# Patient Record
Sex: MALE | Race: WHITE | Employment: FULL TIME | URBAN - METROPOLITAN AREA
[De-identification: names, ages, dates, MRNs, and addresses within clinical notes are randomized per-mention and may not be internally consistent; named-entity substitution may affect disease eponyms.]

---

## 2019-08-16 ENCOUNTER — HOSPITAL ENCOUNTER (INPATIENT)
Age: 59
LOS: 3 days | Discharge: HOME | DRG: 247 | End: 2019-08-19
Attending: EMERGENCY MEDICINE | Admitting: INTERNAL MEDICINE
Payer: OTHER GOVERNMENT

## 2019-08-16 ENCOUNTER — APPOINTMENT (OUTPATIENT)
Dept: GENERAL RADIOLOGY | Age: 59
DRG: 247 | End: 2019-08-16
Attending: EMERGENCY MEDICINE
Payer: OTHER GOVERNMENT

## 2019-08-16 DIAGNOSIS — I49.3 PVC'S (PREMATURE VENTRICULAR CONTRACTIONS): ICD-10-CM

## 2019-08-16 DIAGNOSIS — R07.9 CHEST PAIN: ICD-10-CM

## 2019-08-16 DIAGNOSIS — R07.9 EXERTIONAL CHEST PAIN: Primary | ICD-10-CM

## 2019-08-16 PROBLEM — R79.89 ELEVATED SERUM CREATININE: Status: ACTIVE | Noted: 2019-08-16

## 2019-08-16 PROBLEM — Z99.89 OSA ON CPAP: Status: ACTIVE | Noted: 2019-08-16

## 2019-08-16 PROBLEM — G47.33 OSA ON CPAP: Status: ACTIVE | Noted: 2019-08-16

## 2019-08-16 LAB
ALBUMIN SERPL-MCNC: 3.9 G/DL (ref 3.4–5)
ALBUMIN/GLOB SERPL: 1.3 {RATIO} (ref 0.8–1.7)
ALP SERPL-CCNC: 71 U/L (ref 45–117)
ALT SERPL-CCNC: 38 U/L (ref 16–61)
ANION GAP SERPL CALC-SCNC: 9 MMOL/L (ref 3–18)
APTT PPP: 26.5 SEC (ref 23–36.4)
AST SERPL-CCNC: 19 U/L (ref 10–38)
ATRIAL RATE: 57 BPM
ATRIAL RATE: 83 BPM
BASOPHILS # BLD: 0 K/UL (ref 0–0.1)
BASOPHILS NFR BLD: 0 % (ref 0–2)
BILIRUB SERPL-MCNC: 0.3 MG/DL (ref 0.2–1)
BUN SERPL-MCNC: 18 MG/DL (ref 7–18)
BUN/CREAT SERPL: 13 (ref 12–20)
CALCIUM SERPL-MCNC: 9 MG/DL (ref 8.5–10.1)
CALCULATED P AXIS, ECG09: 38 DEGREES
CALCULATED P AXIS, ECG09: 44 DEGREES
CALCULATED R AXIS, ECG10: -20 DEGREES
CALCULATED R AXIS, ECG10: -26 DEGREES
CALCULATED T AXIS, ECG11: 20 DEGREES
CALCULATED T AXIS, ECG11: 20 DEGREES
CHLORIDE SERPL-SCNC: 103 MMOL/L (ref 100–111)
CK MB CFR SERPL CALC: 1.2 % (ref 0–4)
CK MB SERPL-MCNC: 1.1 NG/ML (ref 5–25)
CK SERPL-CCNC: 89 U/L (ref 39–308)
CO2 SERPL-SCNC: 28 MMOL/L (ref 21–32)
CREAT SERPL-MCNC: 1.41 MG/DL (ref 0.6–1.3)
D DIMER PPP FEU-MCNC: 0.36 UG/ML(FEU)
DIAGNOSIS, 93000: NORMAL
DIAGNOSIS, 93000: NORMAL
DIFFERENTIAL METHOD BLD: ABNORMAL
EOSINOPHIL # BLD: 0.1 K/UL (ref 0–0.4)
EOSINOPHIL NFR BLD: 2 % (ref 0–5)
ERYTHROCYTE [DISTWIDTH] IN BLOOD BY AUTOMATED COUNT: 13.2 % (ref 11.6–14.5)
GLOBULIN SER CALC-MCNC: 2.9 G/DL (ref 2–4)
GLUCOSE SERPL-MCNC: 112 MG/DL (ref 74–99)
HCT VFR BLD AUTO: 42 % (ref 36–48)
HGB BLD-MCNC: 15 G/DL (ref 13–16)
LYMPHOCYTES # BLD: 2 K/UL (ref 0.9–3.6)
LYMPHOCYTES NFR BLD: 24 % (ref 21–52)
MAGNESIUM SERPL-MCNC: 2 MG/DL (ref 1.6–2.6)
MCH RBC QN AUTO: 33.1 PG (ref 24–34)
MCHC RBC AUTO-ENTMCNC: 35.7 G/DL (ref 31–37)
MCV RBC AUTO: 92.7 FL (ref 74–97)
MONOCYTES # BLD: 0.5 K/UL (ref 0.05–1.2)
MONOCYTES NFR BLD: 5 % (ref 3–10)
NEUTS SEG # BLD: 5.7 K/UL (ref 1.8–8)
NEUTS SEG NFR BLD: 69 % (ref 40–73)
P-R INTERVAL, ECG05: 146 MS
P-R INTERVAL, ECG05: 152 MS
PLATELET # BLD AUTO: 356 K/UL (ref 135–420)
PMV BLD AUTO: 10 FL (ref 9.2–11.8)
POTASSIUM SERPL-SCNC: 4.4 MMOL/L (ref 3.5–5.5)
PROT SERPL-MCNC: 6.8 G/DL (ref 6.4–8.2)
Q-T INTERVAL, ECG07: 374 MS
Q-T INTERVAL, ECG07: 402 MS
QRS DURATION, ECG06: 76 MS
QRS DURATION, ECG06: 84 MS
QTC CALCULATION (BEZET), ECG08: 391 MS
QTC CALCULATION (BEZET), ECG08: 439 MS
RBC # BLD AUTO: 4.53 M/UL (ref 4.7–5.5)
SODIUM SERPL-SCNC: 140 MMOL/L (ref 136–145)
TROPONIN I SERPL-MCNC: 0.03 NG/ML (ref 0–0.04)
VENTRICULAR RATE, ECG03: 57 BPM
VENTRICULAR RATE, ECG03: 83 BPM
WBC # BLD AUTO: 8.3 K/UL (ref 4.6–13.2)

## 2019-08-16 PROCEDURE — 99285 EMERGENCY DEPT VISIT HI MDM: CPT

## 2019-08-16 PROCEDURE — 82550 ASSAY OF CK (CPK): CPT

## 2019-08-16 PROCEDURE — 83735 ASSAY OF MAGNESIUM: CPT

## 2019-08-16 PROCEDURE — 74011250636 HC RX REV CODE- 250/636: Performed by: EMERGENCY MEDICINE

## 2019-08-16 PROCEDURE — 74011250637 HC RX REV CODE- 250/637: Performed by: EMERGENCY MEDICINE

## 2019-08-16 PROCEDURE — 85379 FIBRIN DEGRADATION QUANT: CPT

## 2019-08-16 PROCEDURE — 71045 X-RAY EXAM CHEST 1 VIEW: CPT

## 2019-08-16 PROCEDURE — 80053 COMPREHEN METABOLIC PANEL: CPT

## 2019-08-16 PROCEDURE — 94761 N-INVAS EAR/PLS OXIMETRY MLT: CPT

## 2019-08-16 PROCEDURE — 74011250637 HC RX REV CODE- 250/637: Performed by: INTERNAL MEDICINE

## 2019-08-16 PROCEDURE — 85025 COMPLETE CBC W/AUTO DIFF WBC: CPT

## 2019-08-16 PROCEDURE — 96374 THER/PROPH/DIAG INJ IV PUSH: CPT

## 2019-08-16 PROCEDURE — 85730 THROMBOPLASTIN TIME PARTIAL: CPT

## 2019-08-16 PROCEDURE — 93005 ELECTROCARDIOGRAM TRACING: CPT

## 2019-08-16 PROCEDURE — 80061 LIPID PANEL: CPT

## 2019-08-16 PROCEDURE — B2111ZZ FLUOROSCOPY OF MULTIPLE CORONARY ARTERIES USING LOW OSMOLAR CONTRAST: ICD-10-PCS | Performed by: INTERNAL MEDICINE

## 2019-08-16 PROCEDURE — 65660000000 HC RM CCU STEPDOWN

## 2019-08-16 PROCEDURE — 84443 ASSAY THYROID STIM HORMONE: CPT

## 2019-08-16 PROCEDURE — 96375 TX/PRO/DX INJ NEW DRUG ADDON: CPT

## 2019-08-16 PROCEDURE — 027034Z DILATION OF CORONARY ARTERY, ONE ARTERY WITH DRUG-ELUTING INTRALUMINAL DEVICE, PERCUTANEOUS APPROACH: ICD-10-PCS | Performed by: INTERNAL MEDICINE

## 2019-08-16 PROCEDURE — 4A023N7 MEASUREMENT OF CARDIAC SAMPLING AND PRESSURE, LEFT HEART, PERCUTANEOUS APPROACH: ICD-10-PCS | Performed by: INTERNAL MEDICINE

## 2019-08-16 RX ORDER — METOPROLOL TARTRATE 25 MG/1
12.5 TABLET, FILM COATED ORAL EVERY 12 HOURS
Status: DISCONTINUED | OUTPATIENT
Start: 2019-08-16 | End: 2019-08-19 | Stop reason: HOSPADM

## 2019-08-16 RX ORDER — GUAIFENESIN 100 MG/5ML
324 LIQUID (ML) ORAL
Status: COMPLETED | OUTPATIENT
Start: 2019-08-16 | End: 2019-08-16

## 2019-08-16 RX ORDER — ONDANSETRON 2 MG/ML
4 INJECTION INTRAMUSCULAR; INTRAVENOUS
Status: DISCONTINUED | OUTPATIENT
Start: 2019-08-16 | End: 2019-08-19 | Stop reason: HOSPADM

## 2019-08-16 RX ORDER — ASPIRIN 81 MG/1
81 TABLET ORAL DAILY
Status: DISCONTINUED | OUTPATIENT
Start: 2019-08-17 | End: 2019-08-19 | Stop reason: HOSPADM

## 2019-08-16 RX ORDER — NALOXONE HYDROCHLORIDE 0.4 MG/ML
0.4 INJECTION, SOLUTION INTRAMUSCULAR; INTRAVENOUS; SUBCUTANEOUS AS NEEDED
Status: DISCONTINUED | OUTPATIENT
Start: 2019-08-16 | End: 2019-08-19 | Stop reason: HOSPADM

## 2019-08-16 RX ORDER — HEPARIN SODIUM 10000 [USP'U]/100ML
9.8-25 INJECTION, SOLUTION INTRAVENOUS
Status: DISCONTINUED | OUTPATIENT
Start: 2019-08-16 | End: 2019-08-18

## 2019-08-16 RX ORDER — MORPHINE SULFATE 2 MG/ML
1 INJECTION, SOLUTION INTRAMUSCULAR; INTRAVENOUS
Status: DISCONTINUED | OUTPATIENT
Start: 2019-08-16 | End: 2019-08-19 | Stop reason: HOSPADM

## 2019-08-16 RX ORDER — ATORVASTATIN CALCIUM 20 MG/1
40 TABLET, FILM COATED ORAL
Status: DISCONTINUED | OUTPATIENT
Start: 2019-08-16 | End: 2019-08-19 | Stop reason: HOSPADM

## 2019-08-16 RX ORDER — HEPARIN SODIUM 1000 [USP'U]/ML
4000 INJECTION, SOLUTION INTRAVENOUS; SUBCUTANEOUS ONCE
Status: COMPLETED | OUTPATIENT
Start: 2019-08-16 | End: 2019-08-16

## 2019-08-16 RX ORDER — ACETAMINOPHEN 325 MG/1
650 TABLET ORAL
Status: DISCONTINUED | OUTPATIENT
Start: 2019-08-16 | End: 2019-08-19 | Stop reason: HOSPADM

## 2019-08-16 RX ORDER — ONDANSETRON 2 MG/ML
4 INJECTION INTRAMUSCULAR; INTRAVENOUS
Status: COMPLETED | OUTPATIENT
Start: 2019-08-16 | End: 2019-08-16

## 2019-08-16 RX ORDER — NITROGLYCERIN 0.4 MG/1
0.4 TABLET SUBLINGUAL
Status: COMPLETED | OUTPATIENT
Start: 2019-08-16 | End: 2019-08-16

## 2019-08-16 RX ORDER — DIPHENHYDRAMINE HYDROCHLORIDE 50 MG/ML
12.5 INJECTION, SOLUTION INTRAMUSCULAR; INTRAVENOUS
Status: DISCONTINUED | OUTPATIENT
Start: 2019-08-16 | End: 2019-08-19 | Stop reason: HOSPADM

## 2019-08-16 RX ADMIN — HEPARIN SODIUM AND DEXTROSE 9.8 UNITS/KG/HR: 10000; 5 INJECTION INTRAVENOUS at 18:13

## 2019-08-16 RX ADMIN — ONDANSETRON 4 MG: 2 INJECTION INTRAMUSCULAR; INTRAVENOUS at 17:11

## 2019-08-16 RX ADMIN — NITROGLYCERIN 0.4 MG: 0.4 TABLET, ORALLY DISINTEGRATING SUBLINGUAL at 16:41

## 2019-08-16 RX ADMIN — ASPIRIN 81 MG 324 MG: 81 TABLET ORAL at 18:13

## 2019-08-16 RX ADMIN — HEPARIN SODIUM 4000 UNITS: 1000 INJECTION INTRAVENOUS; SUBCUTANEOUS at 18:13

## 2019-08-16 RX ADMIN — METOPROLOL TARTRATE 12.5 MG: 25 TABLET ORAL at 22:16

## 2019-08-16 RX ADMIN — SODIUM CHLORIDE 1000 ML: 900 INJECTION, SOLUTION INTRAVENOUS at 16:56

## 2019-08-16 NOTE — Clinical Note
Tr band removed site w/o complications. 2x2 and transparent dressing applied. Post instructions given.

## 2019-08-16 NOTE — Clinical Note
TRANSFER - IN REPORT:  
 
Verbal report received from: RN. Report consisted of patient's Situation, Background, Assessment and  
Recommendations(SBAR). Opportunity for questions and clarification was provided. Assessment completed upon patient's arrival to unit and care assumed. Patient transported with a Registered Nurse.

## 2019-08-16 NOTE — ED TRIAGE NOTES
Patient with complaints of chest pain across his chest that started yesterday and became worse. Patient reports shortness of breath and nausea as well.

## 2019-08-16 NOTE — Clinical Note
Lesion located in the Mid LAD. Balloon inflated using multiple inflations inflation technique. Pressure = 9 marcela; Duration = 9 sec. Inflation 2: Pressure: 10 marcela; Duration: 6 sec.

## 2019-08-16 NOTE — Clinical Note
TRANSFER - OUT REPORT:  
 
Verbal report given to: Tele RN. Report consisted of patient's Situation, Background, Assessment and  
Recommendations(SBAR). Opportunity for questions and clarification was provided. Patient transported with a Registered Nurse and [address] [place] [address] / [employer] [facility]. Patient transported to: Tele.

## 2019-08-16 NOTE — H&P
History & Physical    Patient: Terri Bernal MRN: [id number]  CSN: [id number]    YOB: 1960  Age: 64 y.o. Sex: male      DOA: 8/16/2019  Primary Care Provider:  Shahida Lucio MD      Assessment/Plan     Hospital Problems  Never Reviewed          Codes Class Noted POA    Exertional chest pain ICD-10-CM: R07.9  ICD-9-CM: 786.50  8/16/2019 Yes        PVC's (premature ventricular contractions) ICD-10-CM: I49.3  ICD-9-CM: 427.69  8/16/2019 Yes        TERRY on CPAP ICD-10-CM: G47.33, Z99.89  ICD-9-CM: 327.23, V46.8  8/16/2019 Unknown        Elevated serum creatinine ICD-10-CM: R79.89  ICD-9-CM: 790.99  8/16/2019 Unknown                Admit to tele     Chest pain  Cardiac monitor, ce trend need to r/o acs   Ekg: no st change at this point,  Will give nc O2 , morphine prn, can not tolerate nitro-hypotension   Cardiology consult -dr. Martin Courser on board   hepartin gtt. Echo   Need cath vs stress test will defer to cardiology   On aspirin, bbb and lipitor   Bedrest till acs r/o      pvc's   Will check mg, tsh , K is ok, echo . terry on cpap      Mild elevated cr   Given ns in er ,will continue monitoring       Estimate  length of stay : 2-3 day    DVT : heparin  ppi proph  CC: chest pain        HPI:     Terri Bernal is a 64 y.o. male who hx of terry on cpap, migraine came to ER due to chest pain. Located rt up chest, sharp-dull pain , 5/10 now. Some times  Radiate to rt shoulder,  not associated with diaphoresis, palpitation/sob/dizziness, but associated with nausea. He had first episode several days ago, resolved per resting. He had dental appointment today, the similar chest pain came back. His wife drove him to ER. He has fhx of MI and he concerns about it. He is not a smoker, drinks alcohol occasionally. No recent travel. He was given nitro -his bp low. Cardiologist was called, heparin gtt started, aspirin was given. Trop x1 negative. cxr no acute issue, ekg no st seg change.         Visit Vitals  /87 (BP 1 Location: Left arm, BP Patient Position: At rest)   Pulse 71   Temp 97.6 °F (36.4 °C)   Resp 18   Ht 5' 8\" (1.727 m)   Wt 101.2 kg (223 lb)   SpO2 100%   BMI 33.91 kg/m²      O2 Device: Room air      Past Medical History:   Diagnosis Date    Sleep apnea        Past Surgical History:   Procedure Laterality Date    HX APPENDECTOMY       fhx  Father  heart attack, MI at 46s. Social History     Socioeconomic History    Marital status:      Spouse name: Not on file    Number of children: Not on file    Years of education: Not on file    Highest education level: Not on file   Tobacco Use    Smoking status: Never Smoker    Smokeless tobacco: Never Used   Substance and Sexual Activity    Alcohol use: Yes       Prior to Admission medications    Not on File       No Known Allergies    Review of Systems  Gen: No fever, chills, malaise, weight loss/gain. Heent: No headache, rhinorrhea, epistaxis, ear pain, hearing loss, sinus pain, neck pain/stiffness, sore throat. Heart:+ chest pain,  No palpitations, MACHADO, pnd, or orthopnea. Resp: No cough, hemoptysis, wheezing and shortness of breath. GI: + nausea, no  vomiting, diarrhea, constipation, melena or hematochezia. : No urinary obstruction, dysuria or hematuria. Derm: No rash, new skin lesion or pruritis. Musc/skeletal: no bone or joint complains. Vasc: No edema, cyanosis or claudication. Endo: No heat/cold intolerance, no polyuria,polydipsia or polyphagia. Neuro: No unilateral weakness, numbness, tingling. No seizures. Heme: No easy bruising or bleeding.           Physical Exam:     Physical Exam:  Visit Vitals  /87 (BP 1 Location: Left arm, BP Patient Position: At rest)   Pulse 71   Temp 97.6 °F (36.4 °C)   Resp 18   Ht 5' 8\" (1.727 m)   Wt 101.2 kg (223 lb)   SpO2 100%   BMI 33.91 kg/m²      O2 Device: Room air    Temp (24hrs), Av.9 °F (36.6 °C), Min:97.6 °F (36.4 °C), Max:98.1 °F (36.7 °C)    No intake/output data recorded. 08/15 0701 - 08/16 1900  In: 1000 [I.V.:1000]  Out: -     General:  Awake, cooperative, no distress. Head:  Normocephalic, without obvious abnormality, atraumatic. Eyes:  Conjunctivae/corneas clear, sclera anicteric, PERRL, EOMs intact. Nose: Nares normal. No drainage or sinus tenderness. Throat: Lips, mucosa, and tongue normal. .   Neck: Supple, symmetrical, trachea midline, no adenopathy. Lungs:   Clear to auscultation bilaterally. Heart:  Regular rate and rhythm, S1, S2 normal, no murmur, click, rub or gallop. Abdomen: Soft, non-tender. Bowel sounds normal. No masses,  No organomegaly. Extremities: Extremities normal, atraumatic, no cyanosis or edema. Pulses: 2+ and symmetric all extremities. Skin: Skin color-pink, texture, turgor normal. No rashes or lesions. Capillary refill normal    Neurologic: CNII-XII intact. No focal motor or sensory deficit.        Labs Reviewed:    BMP:   Lab Results   Component Value Date/Time     08/16/2019 04:58 PM    K 4.4 08/16/2019 04:58 PM     08/16/2019 04:58 PM    CO2 28 08/16/2019 04:58 PM    AGAP 9 08/16/2019 04:58 PM     (H) 08/16/2019 04:58 PM    BUN 18 08/16/2019 04:58 PM    CREA 1.41 (H) 08/16/2019 04:58 PM    GFRAA >60 08/16/2019 04:58 PM    GFRNA 52 (L) 08/16/2019 04:58 PM     CMP:   Lab Results   Component Value Date/Time     08/16/2019 04:58 PM    K 4.4 08/16/2019 04:58 PM     08/16/2019 04:58 PM    CO2 28 08/16/2019 04:58 PM    AGAP 9 08/16/2019 04:58 PM     (H) 08/16/2019 04:58 PM    BUN 18 08/16/2019 04:58 PM    CREA 1.41 (H) 08/16/2019 04:58 PM    GFRAA >60 08/16/2019 04:58 PM    GFRNA 52 (L) 08/16/2019 04:58 PM    CA 9.0 08/16/2019 04:58 PM    MG 2.0 08/16/2019 04:58 PM    ALB 3.9 08/16/2019 04:58 PM    TP 6.8 08/16/2019 04:58 PM    GLOB 2.9 08/16/2019 04:58 PM    AGRAT 1.3 08/16/2019 04:58 PM    SGOT 19 08/16/2019 04:58 PM    ALT 38 08/16/2019 04:58 PM     CBC:   Lab Results   Component Value Date/Time    WBC 8.3 08/16/2019 04:26 PM    HGB 15.0 08/16/2019 04:26 PM    HCT 42.0 08/16/2019 04:26 PM     08/16/2019 04:26 PM     All Cardiac Markers in the last 24 hours:   Lab Results   Component Value Date/Time    CPK 89 08/16/2019 04:58 PM    CKMB 1.1 08/16/2019 04:58 PM    CKND1 1.2 08/16/2019 04:58 PM    TROIQ 0.03 08/16/2019 04:58 PM     Recent Glucose Results:   Lab Results   Component Value Date/Time     (H) 08/16/2019 04:58 PM     ABG: No results found for: PH, PHI, PCO2, PCO2I, PO2, PO2I, HCO3, HCO3I, FIO2, FIO2I  COAGS:   Lab Results   Component Value Date/Time    APTT 26.5 08/16/2019 04:58 PM     Liver Panel:   Lab Results   Component Value Date/Time    ALB 3.9 08/16/2019 04:58 PM    TP 6.8 08/16/2019 04:58 PM    GLOB 2.9 08/16/2019 04:58 PM    AGRAT 1.3 08/16/2019 04:58 PM    SGOT 19 08/16/2019 04:58 PM    ALT 38 08/16/2019 04:58 PM    AP 71 08/16/2019 04:58 PM     Pancreatic Markers: No results found for: AMYLPOCT, AML, LIPPOCT, LPSE    Procedures/imaging: see electronic medical records for all procedures/Xrays and details which were not copied into this note but were reviewed prior to creation of Srinivas Ballard MD, Internal Medicine     CC: Shahida Lucio MD

## 2019-08-16 NOTE — Clinical Note
Lesion located in the Mid LAD. Balloon inflated using multiple inflations inflation technique. Pressure = 10 marcela; Duration = 6 sec.

## 2019-08-16 NOTE — Clinical Note
Lesion located in the Mid LAD. Balloon inflated using multiple inflations inflation technique. Pressure = 10 marcela; Duration = 10 sec. Inflation 2: Pressure: 12 marcela; Duration: 6 sec. Inflation 3: Pressure: 13 marcela; Duration: 8 sec. Inflation 4: Pressure: 15 marcela; Duration: 13 sec. Inflation 5: Pressure: 15 marcela; Duration: 6 sec.

## 2019-08-16 NOTE — CONSULTS
[facility] Consult Note      Patient: Laura Washington MRN: [id number]  SSN: [id number]    YOB: 1960  Age: 64 y.o. Sex: male    Date of Consultation: 08/16/2019  Referring Physician: Vanessa Barajas DO  Reason for Consultation: Chest pain    Chief complain: Chest pain    HPI: 61-year-old gentleman came to emergency room with complaint of chest pain. Patient had episode of chest pain 2 days ago while he was working outside. Chest pain was midsternal, pressure-like, no radiation, associated with perspiration and lasted for 10-15 minutes. Chest pain relieved after taking some rest.  He was not feeling good for the last 2 days. Today while he was going out to his car he developed similar chest pain. Chest pain was midsternal, pressure-like, associated with shortness of breath and perspiration. Chest pain was nonradiating. He came to emergency room and was given sublingual artery disease. Currently he is chest pain-free. He just feels fatigue and tired. He denies any similar episode in the past.  He denies any shortness of breath on exertion. He denies any dizziness, palpation, presyncope or syncope. He denies any orthopnea or PND. He has family history of premature coronary artery disease, his father had MI in his 46s. He denies any smoking or alcohol abuse. Past Medical History:   Diagnosis Date    Sleep apnea  Migraine      Past Surgical History:   Procedure Laterality Date    HX APPENDECTOMY       Current Facility-Administered Medications   Medication Dose Route Frequency    heparin 25,000 units in D5W 250 ml infusion  9.8-25 Units/kg/hr IntraVENous TITRATE     No current outpatient medications on file. Allergies and Intolerances:   No Known Allergies    Family History:   Family history of premature coronary artery disease. Social History:   He  reports that he has never smoked. He has never used smokeless tobacco.  He  reports that he drinks alcohol.       Review of Systems: Gen: No fever, chills, malaise, weight loss/gain. Heent: No headache, rhinorrhea, epistaxis, ear pain, hearing loss, sinus pain, neck pain/stiffness, sore throat. Heart: Positive chest pain, shortness of breath No palpitations, pnd, or orthopnea. Resp: No cough, hemoptysis, wheezing and dyspnea. GI: No nausea, vomiting, diarrhea, constipation, melena or hematochezia. : No urinary obstruction, dysuria or hematuria. Derm: No rash, new skin lesion or pruritis. Musc/skeletal: no bone or joint complains. Vasc: No edema, cyanosis or claudication. Endo: No heat/cold intolerance, no polyuria,polydipsia or polyphagia. Neuro: No unilateral weakness, numbness, tingling. No seizures. Heme: No easy bruising or bleeding. Physical:   Patient Vitals for the past 6 hrs:   Temp Pulse Resp BP SpO2   08/16/19 1715  67 19 120/78 100 %   08/16/19 1700  72 22 113/70 97 %   08/16/19 1658  (!) 59 18 100/68 94 %   08/16/19 1653  (!) 57 16 (!) 73/52 95 %   08/16/19 1607 98.1 °F (36.7 °C) 75 20 (!) 143/91 100 %         Exam:   General Appearance: Comfortable, not using accessory muscles of respiration. HEENT: SCOTT. HEAD: Atraumatic  NECK: No JVD, no thyroidomeglay. CAROTIDS: No bruit  LUNGS: Clear bilaterally. HEART: S1+S2 audible, no murmur, no pericardial rub. ABD: Non-tender, BS Audible    EXT: No edema, and no cyanosis. VASCULAR EXAM: Pulses are intact. PSYCHIATRIC EXAM: Mood is appropriate. MUSCULOSKELETAL: Grossly no joint deformity.   NEUROLOGICAL: AAO times 3, Motor and sensory sytem intact     Review of Data:   LABS:   Lab Results   Component Value Date/Time    WBC 8.3 08/16/2019 04:26 PM    HGB 15.0 08/16/2019 04:26 PM    HCT 42.0 08/16/2019 04:26 PM    PLATELET 075 03/16/6509 04:26 PM     Lab Results   Component Value Date/Time    Sodium 140 08/16/2019 04:58 PM    Potassium 4.4 08/16/2019 04:58 PM    Chloride 103 08/16/2019 04:58 PM    CO2 28 08/16/2019 04:58 PM    Glucose 112 (H) 08/16/2019 04:58 PM    BUN 18 08/16/2019 04:58 PM    Creatinine 1.41 (H) 08/16/2019 04:58 PM     No results found for: CHOL, CHOLX, CHLST, CHOLV, HDL, LDL, LDLC, DLDLP, TGLX, TRIGL, TRIGP  No results found for: GPT  No results found for: HBA1C, HGBE8, UUS0YNZU, VMJ4URPA      Cardiology Procedures:   Results for orders placed or performed during the hospital encounter of 08/16/19   EKG, 12 LEAD, INITIAL   Result Value Ref Range    Ventricular Rate 83 BPM    Atrial Rate 83 BPM    P-R Interval 146 ms    QRS Duration 84 ms    Q-T Interval 374 ms    QTC Calculation (Bezet) 439 ms    Calculated P Axis 38 degrees    Calculated R Axis -26 degrees    Calculated T Axis 20 degrees    Diagnosis       Sinus rhythm with frequent premature ventricular complexes  Left axis  Minimal voltage criteria for LVH, may be normal variant  No ST T changes                Impression / Plan:    Patient Active Problem List   Diagnosis Code    Exertional chest pain R07.9    PVC's (premature ventricular contractions) I49.3     Acute coronary syndrome  Chest pain  Family h/o Premature CAD  Sleep apnea  Migraine    59 year gentleman came to emergency room with chest pain typical of angina. EKG did not reveal any significant ST-T changes. He  Has frequent PVCs. He is currently chest pain-free. I discussed with ER physician and advised to start him on aspirin and IV heparin. Serial EKG and cardiac enzyme every 6-8 hours ×3. Echocardiogram  Aspirin 81 mg by mouth once a day, atorvastatin 40 mg by mouth daily at bedtime. Continue IV heparin and titrate as per aPTT. If blood pressure permits will add low dose of beta blocker and nitrates. Plan discussed with patient and family member at bed side and they verbally understood. 37 minutes spent in the direct evaluation and treatment of this high risk patient.  The reason for providing this level of medical care for this critically ill patient was due a critical illness that impaired one or more vital organ systems such that there was a high probability of imminent or life threatening deterioration in the patients condition. This care involved high complexity decision making to assess, manipulate, and support vital system functions, to treat this degree vital organ system failure and to prevent further life threatening deterioration of the patients condition.     Signed By: Lorna Veras MD     August 16, 2019

## 2019-08-16 NOTE — Clinical Note
Lesion: Located in the Mid LAD. Multiple inflations used. First inflation pressure = 14 marcela; inflation time: 10 sec.

## 2019-08-16 NOTE — PROGRESS NOTES
1855 TRANSFER - IN REPORT:    Verbal report received from Robert Trivedi RN (name) on Laura Washington  being received from ED (unit) for routine progression of care      Report consisted of patients Situation, Background, Assessment and   Recommendations(SBAR). Information from the following report(s) SBAR, Kardex, ED Summary and Cardiac Rhythm SR PVC was reviewed with the receiving nurse. Opportunity for questions and clarification was provided. Assessment completed upon patients arrival to unit and care assumed. 1910 Pt arrived on the unit. Heoarin drip verified with ED nurse. 1927 Bedside and Verbal shift change report given to [facility]  RN(oncoming nurse) by Lizzy Salinas RN (offgoing nurse). Report included the following information SBAR, Kardex, Intake/Output, MAR and Cardiac Rhythm .

## 2019-08-16 NOTE — Clinical Note
Bilateral groin and left radial clipped, prepped with ChloraPrep and draped. Wet prep elapsed drying time: 5 mins.

## 2019-08-16 NOTE — ED PROVIDER NOTES
EMERGENCY DEPARTMENT HISTORY AND PHYSICAL EXAM    Date: 2019  Patient Name: Alessia Christian    History of Presenting Illness     Chief Complaint   Patient presents with    Chest Pain         History Provided By: Patient      Alessia Christian is a 64 y.o. male with PMHX of infrequent migraines who presents to the emergency department C/O chest pain and tightness. Patient states his symptoms started a couple of days ago but was mild and intermittent. He states today he started getting a sharp tightness across the top of his chest that felt different than in the past.  He denies any history of cardiac problems but states that his father  of a heart attack in his early 76s. He states he has never had his heart evaluated before. He denies any shortness of breath but does state that it feels tight when he breathes. States his discomfort is a 5 out of 10 at this time. He states the pain does radiate into his right shoulder and notes that the symptoms did improve when he would sit down and rest versus up and walking. PCP: Nadege Mosquera MD        Past History     Past Medical History:  Past Medical History:   Diagnosis Date    Sleep apnea        Past Surgical History:  Past Surgical History:   Procedure Laterality Date    HX APPENDECTOMY         Family History:  History reviewed. No pertinent family history. Social History:  Social History     Tobacco Use    Smoking status: Never Smoker    Smokeless tobacco: Never Used   Substance Use Topics    Alcohol use: Yes    Drug use: Not on file       Allergies:  No Known Allergies      Review of Systems   Review of Systems   Constitutional: Negative for fever. Respiratory: Positive for chest tightness and shortness of breath. Cardiovascular: Positive for chest pain. Gastrointestinal: Negative for abdominal pain. All other systems reviewed and are negative.         Physical Exam     Vitals:    19 1658 19 1700 19 1715 08/16/19 1943   BP: 100/68 113/70 120/78 133/87   Pulse: (!) 59 72 67 71   Resp: 18 22 19 18   Temp:    97.6 °F (36.4 °C)   SpO2: 94% 97% 100% 100%   Weight:       Height:         Physical Exam    Nursing notes and vital signs reviewed    Constitutional: Patient appears uncomfortable, holding his hand over the center of his chest non toxic appearing, no acute distress  Head: Normocephalic, Atraumatic  Eyes: Pupils are equal, round, and reactive to light, EOMI  Neck: Supple  Cardiovascular: Regular rate and rhythm, no murmurs, rubs, or gallops  Chest: Normal work of breathing and chest excursion bilaterally  Lungs: Clear to ausculation bilaterally  Abdomen: Soft, non tender, non distended, normoactive bowel sounds  Back: No evidence of trauma or deformity  Extremities: No evidence of trauma or deformity, no LE edema  Skin: Warm and dry, normal cap refill  Neuro: Alert and appropriate, CN intact, normal speech, strength and sensation full and symmetric bilaterally, normal gait, normal coordination  Psychiatric: Normal mood and affect      Diagnostic Study Results     Labs -     Recent Results (from the past 12 hour(s))   EKG, 12 LEAD, INITIAL    Collection Time: 08/16/19  4:15 PM   Result Value Ref Range    Ventricular Rate 83 BPM    Atrial Rate 83 BPM    P-R Interval 146 ms    QRS Duration 84 ms    Q-T Interval 374 ms    QTC Calculation (Bezet) 439 ms    Calculated P Axis 38 degrees    Calculated R Axis -26 degrees    Calculated T Axis 20 degrees    Diagnosis       Sinus rhythm with frequent premature ventricular complexes  Minimal voltage criteria for LVH, may be normal variant  Poor R Wave Progression in anterolateral leads   Abnormal ECG  Confirmed by Mario Dominguez MD, Valerie ([id number]) on 8/16/2019 7:43:34 PM     CBC WITH AUTOMATED DIFF    Collection Time: 08/16/19  4:26 PM   Result Value Ref Range    WBC 8.3 4.6 - 13.2 K/uL    RBC 4.53 (L) 4.70 - 5.50 M/uL    HGB 15.0 13.0 - 16.0 g/dL    HCT 42.0 36.0 - 48.0 %    MCV 92.7 74.0 - 97.0 FL    MCH 33.1 24.0 - 34.0 PG    MCHC 35.7 31.0 - 37.0 g/dL    RDW 13.2 11.6 - 14.5 %    PLATELET 977 018 - 237 K/uL    MPV 10.0 9.2 - 11.8 FL    NEUTROPHILS 69 40 - 73 %    LYMPHOCYTES 24 21 - 52 %    MONOCYTES 5 3 - 10 %    EOSINOPHILS 2 0 - 5 %    BASOPHILS 0 0 - 2 %    ABS. NEUTROPHILS 5.7 1.8 - 8.0 K/UL    ABS. LYMPHOCYTES 2.0 0.9 - 3.6 K/UL    ABS. MONOCYTES 0.5 0.05 - 1.2 K/UL    ABS. EOSINOPHILS 0.1 0.0 - 0.4 K/UL    ABS. BASOPHILS 0.0 0.0 - 0.1 K/UL    DF AUTOMATED     CARDIAC PANEL,(CK, CKMB & TROPONIN)    Collection Time: 08/16/19  4:58 PM   Result Value Ref Range    CK 89 39 - 308 U/L    CK - MB 1.1 <3.6 ng/ml    CK-MB Index 1.2 0.0 - 4.0 %    Troponin-I, QT 0.03 0.0 - 4.403 NG/ML   METABOLIC PANEL, COMPREHENSIVE    Collection Time: 08/16/19  4:58 PM   Result Value Ref Range    Sodium 140 136 - 145 mmol/L    Potassium 4.4 3.5 - 5.5 mmol/L    Chloride 103 100 - 111 mmol/L    CO2 28 21 - 32 mmol/L    Anion gap 9 3.0 - 18 mmol/L    Glucose 112 (H) 74 - 99 mg/dL    BUN 18 7.0 - 18 MG/DL    Creatinine 1.41 (H) 0.6 - 1.3 MG/DL    BUN/Creatinine ratio 13 12 - 20      GFR est AA >60 >60 ml/min/1.73m2    GFR est non-AA 52 (L) >60 ml/min/1.73m2    Calcium 9.0 8.5 - 10.1 MG/DL    Bilirubin, total 0.3 0.2 - 1.0 MG/DL    ALT (SGPT) 38 16 - 61 U/L    AST (SGOT) 19 10 - 38 U/L    Alk.  phosphatase 71 45 - 117 U/L    Protein, total 6.8 6.4 - 8.2 g/dL    Albumin 3.9 3.4 - 5.0 g/dL    Globulin 2.9 2.0 - 4.0 g/dL    A-G Ratio 1.3 0.8 - 1.7     D DIMER    Collection Time: 08/16/19  4:58 PM   Result Value Ref Range    D DIMER 0.36 <0.46 ug/ml(FEU)   PTT    Collection Time: 08/16/19  4:58 PM   Result Value Ref Range    aPTT 26.5 23.0 - 36.4 SEC   EKG, 12 LEAD, SUBSEQUENT    Collection Time: 08/16/19  6:03 PM   Result Value Ref Range    Ventricular Rate 57 BPM    Atrial Rate 57 BPM    P-R Interval 152 ms    QRS Duration 76 ms    Q-T Interval 402 ms    QTC Calculation (Bezet) 391 ms    Calculated P Axis 44 degrees Calculated R Axis -20 degrees    Calculated T Axis 20 degrees    Diagnosis       Sinus bradycardia  Left axis deviation  Possible Inferior infarct , age undetermined  Abnormal ECG    Confirmed by Kendrick Levine MD, Erick Miles ([id number]) on 8/16/2019 7:44:07 PM         Radiologic Studies -   XR CHEST PORT   Final Result   IMPRESSION:      Underexpanded lungs without superimposed acute radiographic cardiopulmonary   abnormality. CT Results  (Last 48 hours)    None        CXR Results  (Last 48 hours)               08/16/19 1643  XR CHEST PORT Final result    Impression:  IMPRESSION:       Underexpanded lungs without superimposed acute radiographic cardiopulmonary   abnormality. Narrative:  EXAM: XR CHEST PORT       CLINICAL INDICATION/HISTORY: Chest pain   -Additional: None       COMPARISON: None       TECHNIQUE: Frontal view of the chest       _______________       FINDINGS:       HEART AND MEDIASTINUM: Normal cardiac size and mediastinal contours. LUNGS AND PLEURAL SPACES: Lungs are mildly underexpanded. No focal pneumonic   consolidation, pneumothorax, or pleural effusion.        BONY THORAX AND SOFT TISSUES: No acute osseous abnormality       _______________                 Medications given in the ED-  Medications   heparin 25,000 units in D5W 250 ml infusion (9.8 Units/kg/hr × 101.2 kg IntraVENous Rate Verify 8/16/19 1916)   aspirin delayed-release tablet 81 mg (has no administration in time range)   atorvastatin (LIPITOR) tablet 40 mg (has no administration in time range)   metoprolol tartrate (LOPRESSOR) tablet 12.5 mg (has no administration in time range)   acetaminophen (TYLENOL) tablet 650 mg (has no administration in time range)   naloxone (NARCAN) injection 0.4 mg (has no administration in time range)   diphenhydrAMINE (BENADRYL) injection 12.5 mg (has no administration in time range)   ondansetron (ZOFRAN) injection 4 mg (has no administration in time range)   morphine injection 1 mg (has no administration in time range)   nitroglycerin (NITROSTAT) tablet 0.4 mg (0.4 mg SubLINGual Given 8/16/19 1641)   sodium chloride 0.9 % bolus infusion 1,000 mL (0 mL IntraVENous IV Completed 8/16/19 1723)   ondansetron (ZOFRAN) injection 4 mg (4 mg IntraVENous Given 8/16/19 1711)   aspirin chewable tablet 324 mg (324 mg Oral Given 8/16/19 1813)   heparin (porcine) 1,000 unit/mL injection 4,000 Units (4,000 Units IntraVENous Given 8/16/19 1813)         Medical Decision Making   I am the first provider for this patient. I reviewed the vital signs, available nursing notes, past medical history, past surgical history, family history and social history. Vital Signs-Reviewed the patient's vital signs. Pulse Oximetry Analysis - 100% on room air     Cardiac Monitor:  Rate: 83 bpm  Rhythm: sinus    EKG interpretation: (Preliminary)  EKG read by Dr. Ru Clay    EKG rate 83 sinus rhythm with frequent PVCs, no significant ST changes, normal axis    Records Reviewed: Nursing Notes    Provider Notes (Medical Decision Making): Una Flowers is a 64 y.o. male presenting with exertional chest pain that is new for him. Will obtain laboratory work, chest x-ray and cardiac panel and give nitroglycerin to see if it helps with his pain. Procedures:  Procedures    ED Course:   Patient was given sublingual nitroglycerin with transient improvement of his chest pain although he did develop hypotension transiently to a blood pressure of around 70 systolic. He was given IV fluids as well as put in Trendelenburg with improvement of his symptoms. Repeat blood pressure noted to be 566 systolic. Heart rate normal.    Second EKG showed rate 64 normal sinus rhythm without ST changes. Initial troponin negative chest x-ray normal    CONSULT NOTE:   6:18 PM  Dr. Ru Clay spoke with Dr. Dawson Graft   Specialty: cardio  Discussed pt's hx, disposition, and available diagnostic and imaging results over the telephone.  Reviewed care plans. He recommends full dose aspirin as well as IV heparin and admission under hospitalist service. .     CONSULT NOTE:   6:18 PM  Dr. Fabiana Agosto spoke with Dr. Angela Quinones   Specialty: hospitalist  Discussed pt's hx, disposition, and available diagnostic and imaging results over the telephone. Reviewed care plans. She agrees with admission and requests evaluation for pulmonary embolism initially with a d-dimer. .     I discussed with the patient the results of his laboratory findings and imaging studies and recommendations by cardiology and the hospitalist.  Recommended admission. He understands and agrees to plan    Diagnosis and Disposition         Core Measures:  For Hospitalized Patients:    1. Hospitalization Decision Time:  The decision to hospitalize the patient was made by Fabiana Agosto at 615pm on 8/16/2019    2. Aspirin: Aspirin was given    7:56 PM  Patient is being admitted to the hospital by Dr. Angela Quinones. The results of their tests and reasons for their admission have been discussed with them and/or available family. They convey agreement and understanding for the need to be admitted and for their admission diagnosis. CONDITIONS ON ADMISSION:  Sepsis is not present at the time of admission. Deep Vein Thrombosis is not present at the time of admission. Thrombosis is not present at the time of admission. Urinary Tract Infection is not present at the time of admission. Pneumonia is not present at the time of admission. MRSA is not present at the time of admission. Wound infection is not present at the time of admission. Pressure Ulcer is not present at the time of admission. CLINICAL IMPRESSION:    1. Exertional chest pain    2. PVC's (premature ventricular contractions)          Please note that this dictation was completed with [employer], the [employer] voice recognition software.   Quite often unanticipated grammatical, syntax, homophones, and other interpretive errors are inadvertently transcribed by the computer software. Please disregard these errors. Please excuse any errors that have escaped final proofreading.

## 2019-08-17 LAB
ANION GAP SERPL CALC-SCNC: 7 MMOL/L (ref 3–18)
APTT PPP: 41.2 SEC (ref 23–36.4)
APTT PPP: 82 SEC (ref 23–36.4)
BASOPHILS # BLD: 0 K/UL (ref 0–0.1)
BASOPHILS NFR BLD: 0 % (ref 0–2)
BUN SERPL-MCNC: 19 MG/DL (ref 7–18)
BUN/CREAT SERPL: 14 (ref 12–20)
CALCIUM SERPL-MCNC: 8.5 MG/DL (ref 8.5–10.1)
CHLORIDE SERPL-SCNC: 104 MMOL/L (ref 100–111)
CHOLEST SERPL-MCNC: 234 MG/DL
CK MB CFR SERPL CALC: 2.2 % (ref 0–4)
CK MB CFR SERPL CALC: 3 % (ref 0–4)
CK MB SERPL-MCNC: 2 NG/ML (ref 5–25)
CK MB SERPL-MCNC: 3 NG/ML (ref 5–25)
CK SERPL-CCNC: 100 U/L (ref 39–308)
CK SERPL-CCNC: 91 U/L (ref 39–308)
CO2 SERPL-SCNC: 30 MMOL/L (ref 21–32)
CREAT SERPL-MCNC: 1.38 MG/DL (ref 0.6–1.3)
DIFFERENTIAL METHOD BLD: ABNORMAL
EOSINOPHIL # BLD: 0.1 K/UL (ref 0–0.4)
EOSINOPHIL NFR BLD: 1 % (ref 0–5)
ERYTHROCYTE [DISTWIDTH] IN BLOOD BY AUTOMATED COUNT: 13.1 % (ref 11.6–14.5)
EST. AVERAGE GLUCOSE BLD GHB EST-MCNC: 128 MG/DL
GLUCOSE BLD STRIP.AUTO-MCNC: 100 MG/DL (ref 70–110)
GLUCOSE BLD STRIP.AUTO-MCNC: 117 MG/DL (ref 70–110)
GLUCOSE BLD STRIP.AUTO-MCNC: 118 MG/DL (ref 70–110)
GLUCOSE SERPL-MCNC: 116 MG/DL (ref 74–99)
HBA1C MFR BLD: 6.1 % (ref 4.2–5.6)
HCT VFR BLD AUTO: 36.3 % (ref 36–48)
HDLC SERPL-MCNC: 29 MG/DL (ref 40–60)
HDLC SERPL: 8.1 {RATIO} (ref 0–5)
HGB BLD-MCNC: 12.6 G/DL (ref 13–16)
LDLC SERPL CALC-MCNC: 125.8 MG/DL (ref 0–100)
LIPID PROFILE,FLP: ABNORMAL
LYMPHOCYTES # BLD: 2.1 K/UL (ref 0.9–3.6)
LYMPHOCYTES NFR BLD: 28 % (ref 21–52)
MAGNESIUM SERPL-MCNC: 2.1 MG/DL (ref 1.6–2.6)
MCH RBC QN AUTO: 32.1 PG (ref 24–34)
MCHC RBC AUTO-ENTMCNC: 34.7 G/DL (ref 31–37)
MCV RBC AUTO: 92.6 FL (ref 74–97)
MONOCYTES # BLD: 0.4 K/UL (ref 0.05–1.2)
MONOCYTES NFR BLD: 6 % (ref 3–10)
NEUTS SEG # BLD: 5 K/UL (ref 1.8–8)
NEUTS SEG NFR BLD: 65 % (ref 40–73)
PLATELET # BLD AUTO: 224 K/UL (ref 135–420)
PMV BLD AUTO: 8.6 FL (ref 9.2–11.8)
POTASSIUM SERPL-SCNC: 4.4 MMOL/L (ref 3.5–5.5)
RBC # BLD AUTO: 3.92 M/UL (ref 4.7–5.5)
SODIUM SERPL-SCNC: 141 MMOL/L (ref 136–145)
TRIGL SERPL-MCNC: 396 MG/DL (ref ?–150)
TROPONIN I SERPL-MCNC: 0.56 NG/ML (ref 0–0.04)
TROPONIN I SERPL-MCNC: 0.68 NG/ML (ref 0–0.04)
TSH SERPL DL<=0.05 MIU/L-ACNC: 1.52 UIU/ML (ref 0.36–3.74)
VLDLC SERPL CALC-MCNC: 79.2 MG/DL
WBC # BLD AUTO: 7.7 K/UL (ref 4.6–13.2)

## 2019-08-17 PROCEDURE — 36415 COLL VENOUS BLD VENIPUNCTURE: CPT

## 2019-08-17 PROCEDURE — 83036 HEMOGLOBIN GLYCOSYLATED A1C: CPT

## 2019-08-17 PROCEDURE — 65660000000 HC RM CCU STEPDOWN

## 2019-08-17 PROCEDURE — 83735 ASSAY OF MAGNESIUM: CPT

## 2019-08-17 PROCEDURE — 80048 BASIC METABOLIC PNL TOTAL CA: CPT

## 2019-08-17 PROCEDURE — 82962 GLUCOSE BLOOD TEST: CPT

## 2019-08-17 PROCEDURE — 74011250636 HC RX REV CODE- 250/636: Performed by: EMERGENCY MEDICINE

## 2019-08-17 PROCEDURE — 74011250637 HC RX REV CODE- 250/637: Performed by: HOSPITALIST

## 2019-08-17 PROCEDURE — 74011250636 HC RX REV CODE- 250/636: Performed by: INTERNAL MEDICINE

## 2019-08-17 PROCEDURE — 85730 THROMBOPLASTIN TIME PARTIAL: CPT

## 2019-08-17 PROCEDURE — 74011250637 HC RX REV CODE- 250/637: Performed by: INTERNAL MEDICINE

## 2019-08-17 PROCEDURE — 85025 COMPLETE CBC W/AUTO DIFF WBC: CPT

## 2019-08-17 PROCEDURE — 82550 ASSAY OF CK (CPK): CPT

## 2019-08-17 RX ORDER — MAGNESIUM SULFATE 100 %
4 CRYSTALS MISCELLANEOUS AS NEEDED
Status: DISCONTINUED | OUTPATIENT
Start: 2019-08-17 | End: 2019-08-19 | Stop reason: HOSPADM

## 2019-08-17 RX ORDER — HEPARIN SODIUM 1000 [USP'U]/ML
3000 INJECTION, SOLUTION INTRAVENOUS; SUBCUTANEOUS
Status: COMPLETED | OUTPATIENT
Start: 2019-08-17 | End: 2019-08-17

## 2019-08-17 RX ORDER — CLOPIDOGREL 300 MG/1
300 TABLET, FILM COATED ORAL
Status: COMPLETED | OUTPATIENT
Start: 2019-08-17 | End: 2019-08-17

## 2019-08-17 RX ORDER — INSULIN LISPRO 100 [IU]/ML
INJECTION, SOLUTION INTRAVENOUS; SUBCUTANEOUS
Status: DISCONTINUED | OUTPATIENT
Start: 2019-08-17 | End: 2019-08-19 | Stop reason: HOSPADM

## 2019-08-17 RX ADMIN — ACETAMINOPHEN 650 MG: 325 TABLET ORAL at 16:13

## 2019-08-17 RX ADMIN — ATORVASTATIN CALCIUM 40 MG: 20 TABLET, FILM COATED ORAL at 22:17

## 2019-08-17 RX ADMIN — ASPIRIN 81 MG: 81 TABLET ORAL at 08:37

## 2019-08-17 RX ADMIN — HEPARIN SODIUM AND DEXTROSE 11.8 UNITS/KG/HR: 10000; 5 INJECTION INTRAVENOUS at 16:07

## 2019-08-17 RX ADMIN — METOPROLOL TARTRATE 12.5 MG: 25 TABLET ORAL at 08:37

## 2019-08-17 RX ADMIN — CLOPIDOGREL BISULFATE 300 MG: 300 TABLET, FILM COATED ORAL at 13:04

## 2019-08-17 RX ADMIN — HEPARIN SODIUM 3000 UNITS: 1000 INJECTION INTRAVENOUS; SUBCUTANEOUS at 02:57

## 2019-08-17 NOTE — ROUTINE PROCESS
Bedside and Verbal shift change report given to SAWYER Varma RN (oncoming nurse) by CLINTON Brown RN (offgoing nurse). Report included the following information SBAR, Kardex, Intake/Output, MAR and Recent Results.

## 2019-08-17 NOTE — PROGRESS NOTES
Cardiology Progress Note        Patient: Rick Alfred        Sex: male          DOA: 8/16/2019  YOB: 1960      Age:  64 y.o.        LOS:  LOS: 1 day    Patient seen and examined, chart reviewed. Assessment/Plan     Patient Active Problem List   Diagnosis Code    Migraine     PVC's (premature ventricular contractions) I49.3    TERRY on CPAP G47.33, Z99.89    Elevated serum creatinine R79.89      NSTEMI  Family history of premature coronary artery disease      Plan:    Continue aspirin, metoprolol and atorvastatin. Continue IV heparin and adjust as per aPTT. Give Plavix 300 mg one dose   Echocardiogram.  Advise about LEFT heart catheterization, coronary angiogram plus or minus PCI. All this, benefits and alternatives explained to patient and family member and they verbally understood. Patient and family member agreed for procedure. Continue current management as per hospital medicine. Subjective:    cc:  Denies any chest pain      REVIEW OF SYSTEMS:     General: No fevers or chills. Cardiovascular: No chest pain,No palpitations, No orthopnea, No PND, No leg swelling, No claudication  Pulmonary: No dyspnea. Gastrointestinal: No nausea, vomiting, bleeding  Neurology: No Dizziness    Objective:      Visit Vitals  /75 (BP 1 Location: Left arm, BP Patient Position: At rest)   Pulse (!) 54   Temp 97.9 °F (36.6 °C)   Resp 16   Ht 5' 8\" (1.727 m)   Wt 103.6 kg (228 lb 8 oz)   SpO2 96%   BMI 34.74 kg/m²     Body mass index is 34.74 kg/m². Physical Exam:  General Appearance: Comfortable, not using accessory muscles of respiration. HEENT: SCOTT. HEAD: Atraumatic  NECK: No JVD, no thyroidomeglay. CAROTIDS: No bruit  LUNGS: Clear bilaterally. HEART: S1+S2 audible, no murmur, no pericardial rub. ABD: Non-tender, BS Audible    EXT: No edema, and no cyanosis. VASCULAR EXAM: Pulses are intact.     PSYCHIATRIC EXAM: Mood is appropriate. MUSCULOSKELETAL: Grossly no joint deformity.   NEUROLOGICAL: AAO times 3, No motor and sensory deficit    Medication:  Current Facility-Administered Medications   Medication Dose Route Frequency    insulin lispro (HUMALOG) injection   SubCUTAneous AC&HS    glucose chewable tablet 16 g  4 Tab Oral PRN    glucagon (GLUCAGEN) injection 1 mg  1 mg IntraMUSCular PRN    heparin 25,000 units in D5W 250 ml infusion  9.8-25 Units/kg/hr IntraVENous TITRATE    aspirin delayed-release tablet 81 mg  81 mg Oral DAILY    atorvastatin (LIPITOR) tablet 40 mg  40 mg Oral QHS    metoprolol tartrate (LOPRESSOR) tablet 12.5 mg  12.5 mg Oral Q12H    acetaminophen (TYLENOL) tablet 650 mg  650 mg Oral Q4H PRN    naloxone (NARCAN) injection 0.4 mg  0.4 mg IntraVENous PRN    diphenhydrAMINE (BENADRYL) injection 12.5 mg  12.5 mg IntraVENous Q4H PRN    ondansetron (ZOFRAN) injection 4 mg  4 mg IntraVENous Q4H PRN    morphine injection 1 mg  1 mg IntraVENous Q3H PRN               Lab/Data Reviewed:       Recent Labs     08/17/19  0116 08/16/19  1626   WBC 7.7 8.3   HGB 12.6* 15.0   HCT 36.3 42.0    356     Recent Labs     08/17/19  0116 08/16/19  1658    140   K 4.4 4.4    103   CO2 30 28   * 112*   BUN 19* 18   CREA 1.38* 1.41*   CA 8.5 9.0       Signed By: Lorna Veras MD     August 17, 2019

## 2019-08-17 NOTE — PROGRESS NOTES
1930: Assumed care of the patient from Diamond Marquez RN. Patient is resting in bed with wife at bedside. 1730: Patient has no more chest pain overnight. Bedside and Verbal shift change report given to Diamond Marquez RN (oncoming nurse) by Sarah Manuel RN (offgoing nurse). Report included the following information SBAR, Kardex, Intake/Output, MAR, Accordion and Recent Results.

## 2019-08-17 NOTE — PROGRESS NOTES
Progress Note    Patient: Antelmo Caldwell MRN: [id number]  CSN: [id number]    YOB: 1960  Age: 64 y.o. Sex: male    DOA: 8/16/2019 LOS:  LOS: 1 day                    Subjective:     Chief Complaint:   Chief Complaint   Patient presents with    Chest Pain     No further chest pain   Troponin + mild  Noted and discusssed mild increase in glucose  Tolerating heparin drip  For cath     Review of systems  General: No fevers or chills. Cardiovascular: +chest pain or pressure. No palpitations. Pulmonary: No shortness of breath, cough or wheeze. Gastrointestinal: No abdominal pain, nausea, vomiting or diarrhea. Genitourinary: No urinary frequency, urgency, hesitancy or dysuria. Musculoskeletal: No joint or muscle pain, no back pain, no recent trauma. Neurologic: No headache, numbness, tingling or weakness. Objective:     Physical Exam:  Visit Vitals  /80 (BP 1 Location: Left arm, BP Patient Position: At rest)   Pulse (!) 55   Temp 98.2 °F (36.8 °C)   Resp 16   Ht 5' 8\" (1.727 m)   Wt 103.6 kg (228 lb 8 oz)   SpO2 98%   BMI 34.74 kg/m²        General:         Alert, cooperative, no acute distress    HEENT: NC, Atraumatic. PERRLA, anicteric sclerae. Lungs: CTA Bilaterally. No Wheezing/Rhonchi/Rales. Heart:  Regular  rhythm,  No murmur, No Rubs, No Gallops  Abdomen: Soft, Non distended, Non tender.  +Bowel sounds, no HSM  Extremities: No c/c/e  Psych:   Good insight. Not anxious or agitated. Neurologic:  CN 2-12 grossly intact, Alert and oriented X 3. No acute neurological                                 Deficits,     Intake and Output:  Current Shift:  No intake/output data recorded.   Last three shifts:  08/15 1901 - 08/17 0700  In: 1093.2 [I.V.:1093.2]  Out: -     Labs: Results:       Chemistry Recent Labs     08/17/19  0116 08/16/19  1658   * 112*    140   K 4.4 4.4    103   CO2 30 28   BUN 19* 18   CREA 1.38* 1.41*   CA 8.5 9.0   AGAP 7 9   BUCR 14 13 AP  --  71   TP  --  6.8   ALB  --  3.9   GLOB  --  2.9   AGRAT  --  1.3      CBC w/Diff Recent Labs     08/17/19  0116 08/16/19  1626   WBC 7.7 8.3   RBC 3.92* 4.53*   HGB 12.6* 15.0   HCT 36.3 42.0    356   GRANS 65 69   LYMPH 28 24   EOS 1 2      Cardiac Enzymes Recent Labs     08/17/19  0735 08/17/19  0116    91   CKND1 3.0 2.2      Coagulation Recent Labs     08/17/19  0100 08/16/19  1658   APTT 41.2* 26.5       Lipid Panel No results found for: CHOL, CHOLPOCT, CHOLX, CHLST, CHOLV, 788112, HDL, LDL, LDLC, DLDLP, 364254, VLDLC, VLDL, TGLX, TRIGL, TRIGP, TGLPOCT, CHHD, CHHDX   BNP No results for input(s): BNPP in the last 72 hours.    Liver Enzymes Recent Labs     08/16/19  1658   TP 6.8   ALB 3.9   AP 71   SGOT 19      Thyroid Studies No results found for: T4, T3U, TSH, TSHEXT       Procedures/imaging: see electronic medical records for all procedures/Xrays and details which were not copied into this note but were reviewed prior to creation of Plan    Medications:   Current Facility-Administered Medications   Medication Dose Route Frequency    heparin 25,000 units in D5W 250 ml infusion  9.8-25 Units/kg/hr IntraVENous TITRATE    aspirin delayed-release tablet 81 mg  81 mg Oral DAILY    atorvastatin (LIPITOR) tablet 40 mg  40 mg Oral QHS    metoprolol tartrate (LOPRESSOR) tablet 12.5 mg  12.5 mg Oral Q12H    acetaminophen (TYLENOL) tablet 650 mg  650 mg Oral Q4H PRN    naloxone (NARCAN) injection 0.4 mg  0.4 mg IntraVENous PRN    diphenhydrAMINE (BENADRYL) injection 12.5 mg  12.5 mg IntraVENous Q4H PRN    ondansetron (ZOFRAN) injection 4 mg  4 mg IntraVENous Q4H PRN    morphine injection 1 mg  1 mg IntraVENous Q3H PRN       Assessment/Plan     Principal Problem:    Exertional chest pain (8/16/2019)    Active Problems:    PVC's (premature ventricular contractions) (8/16/2019)      TERRY on CPAP (8/16/2019)      Elevated serum creatinine (8/16/2019)    Plan  Cont IV heparin  For cath  Check HBAC and FLP      Case discussed with:  []Patient  []Family  []Nursing  []Case Management  DVT Prophylaxis:  []Lovenox  []Hep SQ  []SCDs  []Coumadin   []On Heparin gtt    Marry Kurtz MD  8/17/2019 8:39 AM

## 2019-08-17 NOTE — PROGRESS NOTES
Problem: Falls - Risk of  Goal: *Absence of Falls  Description  Document Aleisha San Fall Risk and appropriate interventions in the flowsheet. Outcome: Progressing Towards Goal  Note:   Fall Risk Interventions:  Pt wearing nonskid socks, bed locked in lowest position, taught to arise slowly.     Mobility Interventions: Assess mobility with egress test         Medication Interventions: Evaluate medications/consider consulting pharmacy, Teach patient to arise slowly                   Problem: Patient Education: Go to Patient Education Activity  Goal: Patient/Family Education  Outcome: Progressing Towards Goal

## 2019-08-17 NOTE — PHYSICIAN ADVISORY
Letter of Status Determination:   Recommend hospitalization status   Inpatient       Pt Name:  Serjio Ordonez   MR#   [address] [employer] [facility] # [id number] /  [id number]  Payor: Aldo Hernandez / Plan: Lalit Del Rio 74 / Product Type: Obi Boateng /    FIORDALIZA#  [id number]   [address] [place] [address]/01  @ [facility]   Hospitalization date  8/16/2019  4:10 PM   Current Attending Physician  Tyler Fuller MD   Principal diagnosis  Exertional chest pain [R07.9]     Clinicals  64 y.o. y.o  male hospitalized with exertional chest pain, and PVC's .  Abnormal Tropnin level consistent with myocardial injury       Criteria    NSTEMI   STATUS DETERMINATION  INPATIENT       Additional comments     Payor: SHANTELL / Plan: Lalit Del Rio 74 / Product Type: Obi Boateng /           Carrillo Castillo MD

## 2019-08-17 NOTE — PROGRESS NOTES
0700 Received bedside report from CLINTON Baez 310, [address] Educated pt and wife on Heparin and PTT, Plavix. Dr. Lakisha Eddy saw pt, cardiac cath planned for tomorrow.

## 2019-08-17 NOTE — ROUTINE PROCESS
Bedside and Verbal shift change report given to LEI Lee RN (oncoming nurse) by Della Lauren RN (offgoing nurse). Report included the following information SBAR, Kardex, Intake/Output and MAR.

## 2019-08-18 PROBLEM — R07.9 CHEST PAIN: Status: ACTIVE | Noted: 2019-08-16

## 2019-08-18 LAB
ACT BLD: 195 SECS (ref 79–138)
ANION GAP SERPL CALC-SCNC: 8 MMOL/L (ref 3–18)
APTT PPP: 47.2 SEC (ref 23–36.4)
BASOPHILS # BLD: 0 K/UL (ref 0–0.1)
BASOPHILS NFR BLD: 0 % (ref 0–2)
BUN SERPL-MCNC: 19 MG/DL (ref 7–18)
BUN/CREAT SERPL: 14 (ref 12–20)
CALCIUM SERPL-MCNC: 8.3 MG/DL (ref 8.5–10.1)
CHLORIDE SERPL-SCNC: 104 MMOL/L (ref 100–111)
CO2 SERPL-SCNC: 28 MMOL/L (ref 21–32)
CREAT SERPL-MCNC: 1.35 MG/DL (ref 0.6–1.3)
DIFFERENTIAL METHOD BLD: ABNORMAL
EOSINOPHIL # BLD: 0.1 K/UL (ref 0–0.4)
EOSINOPHIL NFR BLD: 2 % (ref 0–5)
ERYTHROCYTE [DISTWIDTH] IN BLOOD BY AUTOMATED COUNT: 13.3 % (ref 11.6–14.5)
GLUCOSE BLD STRIP.AUTO-MCNC: 111 MG/DL (ref 70–110)
GLUCOSE BLD STRIP.AUTO-MCNC: 113 MG/DL (ref 70–110)
GLUCOSE BLD STRIP.AUTO-MCNC: 155 MG/DL (ref 70–110)
GLUCOSE BLD STRIP.AUTO-MCNC: 95 MG/DL (ref 70–110)
GLUCOSE SERPL-MCNC: 106 MG/DL (ref 74–99)
HCT VFR BLD AUTO: 38.8 % (ref 36–48)
HCT VFR BLD AUTO: 38.8 % (ref 36–48)
HGB BLD-MCNC: 13.3 G/DL (ref 13–16)
HGB BLD-MCNC: 13.7 G/DL (ref 13–16)
INR PPP: 0.9 (ref 0.8–1.2)
LYMPHOCYTES # BLD: 2.2 K/UL (ref 0.9–3.6)
LYMPHOCYTES NFR BLD: 33 % (ref 21–52)
MAGNESIUM SERPL-MCNC: 2 MG/DL (ref 1.6–2.6)
MCH RBC QN AUTO: 32.1 PG (ref 24–34)
MCHC RBC AUTO-ENTMCNC: 34.3 G/DL (ref 31–37)
MCV RBC AUTO: 93.7 FL (ref 74–97)
MONOCYTES # BLD: 0.4 K/UL (ref 0.05–1.2)
MONOCYTES NFR BLD: 7 % (ref 3–10)
NEUTS SEG # BLD: 3.8 K/UL (ref 1.8–8)
NEUTS SEG NFR BLD: 58 % (ref 40–73)
PLATELET # BLD AUTO: 253 K/UL (ref 135–420)
PMV BLD AUTO: 9 FL (ref 9.2–11.8)
POTASSIUM SERPL-SCNC: 4.1 MMOL/L (ref 3.5–5.5)
PROTHROMBIN TIME: 12.3 SEC (ref 11.5–15.2)
RBC # BLD AUTO: 4.14 M/UL (ref 4.7–5.5)
SODIUM SERPL-SCNC: 140 MMOL/L (ref 136–145)
WBC # BLD AUTO: 6.5 K/UL (ref 4.6–13.2)

## 2019-08-18 PROCEDURE — 74011250637 HC RX REV CODE- 250/637: Performed by: INTERNAL MEDICINE

## 2019-08-18 PROCEDURE — 74011250636 HC RX REV CODE- 250/636: Performed by: INTERNAL MEDICINE

## 2019-08-18 PROCEDURE — 85018 HEMOGLOBIN: CPT

## 2019-08-18 PROCEDURE — 85347 COAGULATION TIME ACTIVATED: CPT

## 2019-08-18 PROCEDURE — 83735 ASSAY OF MAGNESIUM: CPT

## 2019-08-18 PROCEDURE — 80048 BASIC METABOLIC PNL TOTAL CA: CPT

## 2019-08-18 PROCEDURE — 74011250637 HC RX REV CODE- 250/637: Performed by: HOSPITALIST

## 2019-08-18 PROCEDURE — 85730 THROMBOPLASTIN TIME PARTIAL: CPT

## 2019-08-18 PROCEDURE — 77030029997 HC DEV COM RDL R BND TELE -B: Performed by: INTERNAL MEDICINE

## 2019-08-18 PROCEDURE — 77030004522 HC CATH ANGI DX EXPO BSC -A: Performed by: INTERNAL MEDICINE

## 2019-08-18 PROCEDURE — 85025 COMPLETE CBC W/AUTO DIFF WBC: CPT

## 2019-08-18 PROCEDURE — C1894 INTRO/SHEATH, NON-LASER: HCPCS | Performed by: INTERNAL MEDICINE

## 2019-08-18 PROCEDURE — C1769 GUIDE WIRE: HCPCS | Performed by: INTERNAL MEDICINE

## 2019-08-18 PROCEDURE — 85610 PROTHROMBIN TIME: CPT

## 2019-08-18 PROCEDURE — 77030020177 HC ELECTRD DEFIB PD PHIL -B: Performed by: INTERNAL MEDICINE

## 2019-08-18 PROCEDURE — 82962 GLUCOSE BLOOD TEST: CPT

## 2019-08-18 PROCEDURE — 93458 L HRT ARTERY/VENTRICLE ANGIO: CPT | Performed by: INTERNAL MEDICINE

## 2019-08-18 PROCEDURE — 74011250636 HC RX REV CODE- 250/636

## 2019-08-18 PROCEDURE — 92928 PRQ TCAT PLMT NTRAC ST 1 LES: CPT | Performed by: INTERNAL MEDICINE

## 2019-08-18 PROCEDURE — 74011636320 HC RX REV CODE- 636/320: Performed by: INTERNAL MEDICINE

## 2019-08-18 PROCEDURE — C1887 CATHETER, GUIDING: HCPCS | Performed by: INTERNAL MEDICINE

## 2019-08-18 PROCEDURE — 99153 MOD SED SAME PHYS/QHP EA: CPT | Performed by: INTERNAL MEDICINE

## 2019-08-18 PROCEDURE — 77030028790 HC ACC ST CTRL VLV COPLT ABBT -B: Performed by: INTERNAL MEDICINE

## 2019-08-18 PROCEDURE — C1725 CATH, TRANSLUMIN NON-LASER: HCPCS | Performed by: INTERNAL MEDICINE

## 2019-08-18 PROCEDURE — 77030013797 HC KT TRNSDUC PRSSR EDWD -A: Performed by: INTERNAL MEDICINE

## 2019-08-18 PROCEDURE — 36415 COLL VENOUS BLD VENIPUNCTURE: CPT

## 2019-08-18 PROCEDURE — 74011000250 HC RX REV CODE- 250: Performed by: INTERNAL MEDICINE

## 2019-08-18 PROCEDURE — C1874 STENT, COATED/COV W/DEL SYS: HCPCS | Performed by: INTERNAL MEDICINE

## 2019-08-18 PROCEDURE — 74011636637 HC RX REV CODE- 636/637: Performed by: INTERNAL MEDICINE

## 2019-08-18 PROCEDURE — 77030008543 HC TBNG MON PRSS MRTM -A: Performed by: INTERNAL MEDICINE

## 2019-08-18 PROCEDURE — 65660000000 HC RM CCU STEPDOWN

## 2019-08-18 PROCEDURE — 99152 MOD SED SAME PHYS/QHP 5/>YRS: CPT | Performed by: INTERNAL MEDICINE

## 2019-08-18 DEVICE — XIENCE SIERRA™ EVEROLIMUS ELUTING CORONARY STENT SYSTEM 3.50 MM X 23 MM / RAPID-EXCHANGE
Type: IMPLANTABLE DEVICE | Site: CORONARY | Status: FUNCTIONAL
Brand: XIENCE SIERRA™

## 2019-08-18 RX ORDER — HEPARIN SODIUM 200 [USP'U]/100ML
INJECTION, SOLUTION INTRAVENOUS
Status: COMPLETED | OUTPATIENT
Start: 2019-08-18 | End: 2019-08-18

## 2019-08-18 RX ORDER — MIDAZOLAM HYDROCHLORIDE 1 MG/ML
INJECTION, SOLUTION INTRAMUSCULAR; INTRAVENOUS AS NEEDED
Status: DISCONTINUED | OUTPATIENT
Start: 2019-08-18 | End: 2019-08-18 | Stop reason: HOSPADM

## 2019-08-18 RX ORDER — FENTANYL CITRATE 50 UG/ML
INJECTION, SOLUTION INTRAMUSCULAR; INTRAVENOUS AS NEEDED
Status: DISCONTINUED | OUTPATIENT
Start: 2019-08-18 | End: 2019-08-18 | Stop reason: HOSPADM

## 2019-08-18 RX ORDER — SODIUM CHLORIDE 0.9 % (FLUSH) 0.9 %
5-40 SYRINGE (ML) INJECTION EVERY 8 HOURS
Status: DISCONTINUED | OUTPATIENT
Start: 2019-08-18 | End: 2019-08-19 | Stop reason: HOSPADM

## 2019-08-18 RX ORDER — EPTIFIBATIDE 0.75 MG/ML
INJECTION, SOLUTION INTRAVENOUS
Status: COMPLETED | OUTPATIENT
Start: 2019-08-18 | End: 2019-08-18

## 2019-08-18 RX ORDER — SODIUM CHLORIDE 0.9 % (FLUSH) 0.9 %
5-40 SYRINGE (ML) INJECTION AS NEEDED
Status: DISCONTINUED | OUTPATIENT
Start: 2019-08-18 | End: 2019-08-19 | Stop reason: HOSPADM

## 2019-08-18 RX ORDER — EPTIFIBATIDE 0.75 MG/ML
2 INJECTION, SOLUTION INTRAVENOUS CONTINUOUS
Status: DISPENSED | OUTPATIENT
Start: 2019-08-18 | End: 2019-08-18

## 2019-08-18 RX ORDER — SODIUM CHLORIDE 9 MG/ML
75 INJECTION, SOLUTION INTRAVENOUS CONTINUOUS
Status: DISPENSED | OUTPATIENT
Start: 2019-08-18 | End: 2019-08-19

## 2019-08-18 RX ORDER — HEPARIN SODIUM 1000 [USP'U]/ML
3000 INJECTION, SOLUTION INTRAVENOUS; SUBCUTANEOUS ONCE
Status: COMPLETED | OUTPATIENT
Start: 2019-08-18 | End: 2019-08-18

## 2019-08-18 RX ORDER — EPTIFIBATIDE 2 MG/ML
INJECTION, SOLUTION INTRAVENOUS AS NEEDED
Status: DISCONTINUED | OUTPATIENT
Start: 2019-08-18 | End: 2019-08-18 | Stop reason: HOSPADM

## 2019-08-18 RX ORDER — VERAPAMIL HYDROCHLORIDE 2.5 MG/ML
INJECTION, SOLUTION INTRAVENOUS AS NEEDED
Status: DISCONTINUED | OUTPATIENT
Start: 2019-08-18 | End: 2019-08-18 | Stop reason: HOSPADM

## 2019-08-18 RX ORDER — ENOXAPARIN SODIUM 100 MG/ML
40 INJECTION SUBCUTANEOUS EVERY 24 HOURS
Status: DISCONTINUED | OUTPATIENT
Start: 2019-08-19 | End: 2019-08-19 | Stop reason: HOSPADM

## 2019-08-18 RX ORDER — HEPARIN SODIUM 1000 [USP'U]/ML
INJECTION, SOLUTION INTRAVENOUS; SUBCUTANEOUS AS NEEDED
Status: DISCONTINUED | OUTPATIENT
Start: 2019-08-18 | End: 2019-08-18 | Stop reason: HOSPADM

## 2019-08-18 RX ORDER — LIDOCAINE HYDROCHLORIDE 10 MG/ML
INJECTION INFILTRATION; PERINEURAL AS NEEDED
Status: DISCONTINUED | OUTPATIENT
Start: 2019-08-18 | End: 2019-08-18 | Stop reason: HOSPADM

## 2019-08-18 RX ADMIN — INSULIN LISPRO 2 UNITS: 100 INJECTION, SOLUTION INTRAVENOUS; SUBCUTANEOUS at 18:46

## 2019-08-18 RX ADMIN — ACETAMINOPHEN 650 MG: 325 TABLET ORAL at 17:26

## 2019-08-18 RX ADMIN — EPTIFIBATIDE 2 MCG/KG/MIN: 0.75 INJECTION, SOLUTION INTRAVENOUS at 19:08

## 2019-08-18 RX ADMIN — HEPARIN SODIUM 3000 UNITS: 1000 INJECTION INTRAVENOUS; SUBCUTANEOUS at 06:40

## 2019-08-18 RX ADMIN — SODIUM CHLORIDE 75 ML/HR: 900 INJECTION, SOLUTION INTRAVENOUS at 15:48

## 2019-08-18 RX ADMIN — Medication 10 ML: at 19:12

## 2019-08-18 RX ADMIN — ATORVASTATIN CALCIUM 40 MG: 20 TABLET, FILM COATED ORAL at 22:11

## 2019-08-18 RX ADMIN — ASPIRIN 81 MG: 81 TABLET ORAL at 08:40

## 2019-08-18 RX ADMIN — METOPROLOL TARTRATE 12.5 MG: 25 TABLET ORAL at 08:40

## 2019-08-18 RX ADMIN — TICAGRELOR 90 MG: 90 TABLET ORAL at 22:11

## 2019-08-18 NOTE — PROGRESS NOTES
Problem: Unstable angina/NSTEMI: Day of Admission/Day 1  Goal: Activity/Safety  Outcome: Progressing Towards Goal     Problem: Unstable angina/NSTEMI: Day of Admission/Day 1  Goal: Consults, if ordered  Outcome: Progressing Towards Goal     Problem: Unstable angina/NSTEMI: Day of Admission/Day 1  Goal: Diagnostic Test/Procedures  Outcome: Progressing Towards Goal  Note:   Pt had cardiac cath with stent placement through left wrist.     Problem: Unstable angina/NSTEMI: Day of Admission/Day 1  Goal: Nutrition/Diet  Outcome: Progressing Towards Goal  Note:   NPO before procedure. Pt returned from cath, ate dinner, on cardiac diet. Problem: Unstable angina/NSTEMI: Day of Admission/Day 1  Goal: Discharge Planning  Outcome: Progressing Towards Goal     Problem: Unstable angina/NSTEMI: Day of Admission/Day 1  Goal: Medications  Outcome: Progressing Towards Goal     Problem: Unstable angina/NSTEMI: Day of Admission/Day 1  Goal: Respiratory  Outcome: Progressing Towards Goal  Note:   Uses CPAP at night, RA during the day. Problem: Unstable angina/NSTEMI: Day of Admission/Day 1  Goal: Treatments/Interventions/Procedures  Outcome: Progressing Towards Goal     Problem: Unstable angina/NSTEMI: Day of Admission/Day 1  Goal: Psychosocial  Outcome: Progressing Towards Goal     Problem: Unstable angina/NSTEMI: Day of Admission/Day 1  Goal: *Hemodynamically stable  Outcome: Progressing Towards Goal     Problem: Unstable angina/NSTEMI: Day of Admission/Day 1  Goal: *Optimal pain control at patient's stated goal  Outcome: Progressing Towards Goal  Note:   Pt has only had a mild Ha, treated with tylenol.       Problem: Unstable angina/NSTEMI: Day of Admission/Day 1  Goal: *Lungs clear or at baseline  Outcome: Progressing Towards Goal  Note:   Lungs clear on RA

## 2019-08-18 NOTE — ROUTINE PROCESS
Bedside and Verbal shift change report given to LEI Wills RN (oncoming nurse) by Burtis Jeans. Darion Varma RN (offgoing nurse). Report included the following information SBAR, Kardex, Intake/Output and MAR.

## 2019-08-18 NOTE — PROCEDURES
LHC and Coronary angiogram done via left radial approach. Coronary anatomy described below with noted coronary artery disease. PCI of mLAD done with MARCO with good result. Left main is short and has luminal irregularities. Left main bifurcates in LAD and LCx. LAD has luminal irregularities. Mid LAD has 99% stenosis. D1 is small caliber vessel with ostial 85% stenosis. D2 is small caliber vessel with luminal irregularities. LCx is non dominant vessel. LCx has luminal irregularities. OM1 is large caliber vessel with luminal irregularities. RCA is dominant vessel. Proximal RCA has 30% stenosis. RCA has luminal irregularities. RPDA is medium caliber vessel with luminal irregularities. RPLA is small caliber vessel with luminal irregularities. LV gram was not done. LVEDP 9-11 mm hg. No significant gradient on pull back. Patient tolerated procedure well. Scant blood loss. No complications. No specimen removed. Recommendation:    Medication considered:   Aspirin, beta blocker, ACEI, Nitrates and statin   Brilinta 180 mg now followed by 90 mg twice a day. Integrilin for 6 hours    CAD risk factor education  Diet education  Control cholesterol  Blood pressure control  Exercise education: Age and functional status appropriate. Cardiac rehab referral done. Plan discussed with patient and family member.

## 2019-08-18 NOTE — PROGRESS NOTES
Progress Note    Patient: Antelmo Caldwell MRN: [id number]  CSN: [id number]    YOB: 1960  Age: 64 y.o. Sex: male    DOA: 8/16/2019 LOS:  LOS: 2 days                    Subjective:     Chief Complaint:   Chief Complaint   Patient presents with    Chest Pain     No further episodes of chest pain  Had Cath today LAD found  Discussed glucose levels and cholesterol  Motivated to be healthy wife at bedside     Review of systems  General: No fevers or chills. Cardiovascular: No chest pain or pressure. No palpitations. Pulmonary: No shortness of breath, cough or wheeze. Gastrointestinal: No abdominal pain, nausea, vomiting or diarrhea. Genitourinary: No urinary frequency, urgency, hesitancy or dysuria. Musculoskeletal: No joint or muscle pain, no back pain, no recent trauma. Neurologic: No headache, numbness, tingling or weakness. Objective:     Physical Exam:  Visit Vitals  /66   Pulse 68   Temp 97.8 °F (36.6 °C)   Resp 16   Ht 5' 8\" (1.727 m)   Wt 103.6 kg (228 lb 8 oz)   SpO2 99%   BMI 34.74 kg/m²        General:         Alert, cooperative, no acute distress    HEENT: NC, Atraumatic. PERRLA, anicteric sclerae. Lungs: CTA Bilaterally. No Wheezing/Rhonchi/Rales. Heart:  Regular  rhythm,  No murmur, No Rubs, No Gallops  Abdomen: Soft, Non distended, Non tender.  +Bowel sounds, no HSM  Extremities: No c/c/e  Psych:   Good insight. Not anxious or agitated. Neurologic:  CN 2-12 grossly intact, Alert and oriented X 3. No acute neurological                                 Deficits,     Intake and Output:  Current Shift:  No intake/output data recorded. Last three shifts:  08/16 1901 - 08/18 0700  In: 659.6 [P.O.:240;  I.V.:419.6]  Out: -     Labs: Results:       Chemistry Recent Labs     08/18/19  0445 08/17/19  0116 08/16/19  1658   * 116* 112*    141 140   K 4.1 4.4 4.4    104 103   CO2 28 30 28   BUN 19* 19* 18   CREA 1.35* 1.38* 1.41*   CA 8.3* 8.5 9.0 AGAP 8 7 9   BUCR 14 14 13   AP  --   --  71   TP  --   --  6.8   ALB  --   --  3.9   GLOB  --   --  2.9   AGRAT  --   --  1.3      CBC w/Diff Recent Labs     08/18/19  0445 08/17/19  0116 08/16/19  1626   WBC 6.5 7.7 8.3   RBC 4.14* 3.92* 4.53*   HGB 13.3 12.6* 15.0   HCT 38.8 36.3 42.0    224 356   GRANS 58 65 69   LYMPH 33 28 24   EOS 2 1 2      Cardiac Enzymes Recent Labs     08/17/19  0735 08/17/19  0116    91   CKND1 3.0 2.2      Coagulation Recent Labs     08/18/19 0445 08/17/19  0735   PTP 12.3  --    INR 0.9  --    APTT 47.2* 82.0*       Lipid Panel Lab Results   Component Value Date/Time    Cholesterol, total 234 (H) 08/16/2019 04:58 PM    HDL Cholesterol 29 (L) 08/16/2019 04:58 PM    LDL, calculated 125.8 (H) 08/16/2019 04:58 PM    VLDL, calculated 79.2 08/16/2019 04:58 PM    Triglyceride 396 (H) 08/16/2019 04:58 PM    CHOL/HDL Ratio 8.1 (H) 08/16/2019 04:58 PM      BNP No results for input(s): BNPP in the last 72 hours.    Liver Enzymes Recent Labs     08/16/19  1658   TP 6.8   ALB 3.9   AP 71   SGOT 19      Thyroid Studies Lab Results   Component Value Date/Time    TSH 1.52 08/16/2019 04:58 PM          Procedures/imaging: see electronic medical records for all procedures/Xrays and details which were not copied into this note but were reviewed prior to creation of Plan    Medications:   Current Facility-Administered Medications   Medication Dose Route Frequency    insulin lispro (HUMALOG) injection   SubCUTAneous AC&HS    glucose chewable tablet 16 g  4 Tab Oral PRN    glucagon (GLUCAGEN) injection 1 mg  1 mg IntraMUSCular PRN    heparin 25,000 units in D5W 250 ml infusion  9.8-25 Units/kg/hr IntraVENous TITRATE    aspirin delayed-release tablet 81 mg  81 mg Oral DAILY    atorvastatin (LIPITOR) tablet 40 mg  40 mg Oral QHS    metoprolol tartrate (LOPRESSOR) tablet 12.5 mg  12.5 mg Oral Q12H    acetaminophen (TYLENOL) tablet 650 mg  650 mg Oral Q4H PRN    naloxone (NARCAN) injection 0.4 mg  0.4 mg IntraVENous PRN    diphenhydrAMINE (BENADRYL) injection 12.5 mg  12.5 mg IntraVENous Q4H PRN    ondansetron (ZOFRAN) injection 4 mg  4 mg IntraVENous Q4H PRN    morphine injection 1 mg  1 mg IntraVENous Q3H PRN       Assessment/Plan     Principal Problem:    Exertional chest pain (8/16/2019)    Active Problems:    PVC's (premature ventricular contractions) (8/16/2019)      TERRY on CPAP (8/16/2019)      Elevated serum creatinine (8/16/2019)    Plan:  Post cath and stent   anticpate discharge tomorrow  Discussed DM prevention ect. .    Case discussed with:  [x]Patient  [x]Family  []Nursing  []Case Management  DVT Prophylaxis:  []Lovenox  []Hep SQ  []SCDs  []Coumadin   []On Heparin gtt    Amparo Matos MD  8/18/2019 8:42 AM

## 2019-08-18 NOTE — ROUTINE PROCESS
Cardiac Cath Lab:  Pre Procedure Chart Check     Patients chart was accessed and reviewed for possible and/or scheduled procedure. Creatinine Clearance:  Serum creatinine: 1.35 mg/dL (H) 08/18/19 0445  Estimated creatinine clearance: 70.4 mL/min (A)    Total Contrast  Load:  3 x estimated clearance amount=  211.2ml    75% of Contrast Load:  0.75 x Total Contrast Load=    158ml    Recent Labs     08/18/19  0445 08/17/19  0735   WBC 6.5  --    RBC 4.14*  --    HCT 38.8  --    HGB 13.3  --      --    INR 0.9  --    APTT 47.2* 82.0*   PTP 12.3  --      --    K 4.1  --    BUN 19*  --    CREA 1.35*  --    GFRAA >60  --    GFRNA 54*  --    CA 8.3*  --    CPK  --  100   CKMB  --  3.0   CKND1  --  3.0   TROIQ  --  0.68*       BMI: Body mass index is 34.74 kg/m². ALLERGIES: No Known Allergies    Lines:        Peripheral IV 08/16/19 Upper;Left Forearm (Active)   Site Assessment Clean, dry, & intact 8/18/2019 11:40 AM   Phlebitis Assessment 0 8/18/2019 11:40 AM   Infiltration Assessment 0 8/18/2019 11:40 AM   Dressing Status Clean, dry, & intact 8/18/2019 11:40 AM   Dressing Type Transparent 8/18/2019 11:40 AM   Hub Color/Line Status Green; Infusing;Capped 8/18/2019 11:40 AM   Action Taken Open ports on tubing capped 8/18/2019 11:40 AM   Alcohol Cap Used Yes 8/18/2019 11:40 AM          History:    Past Medical History:   Diagnosis Date    Sleep apnea      Past Surgical History:   Procedure Laterality Date    HX APPENDECTOMY       Patient Active Problem List   Diagnosis Code    Exertional chest pain R07.9    PVC's (premature ventricular contractions) I49.3    TERRY on CPAP G47.33, Z99.89    Elevated serum creatinine R79.89    Chest pain R07.9

## 2019-08-18 NOTE — PROGRESS NOTES
TRANSFER - IN REPORT:    Verbal report received from KEENAN Clark ([facility]  being received from Cardiac Cath (unit) for routine progression of care      Report consisted of patients Situation, Background, Assessment and   Recommendations(SBAR). Information from the following report(s) SBAR and Procedure Summary was reviewed with the receiving nurse. Opportunity for questions and clarification was provided. Assessment completed upon patients arrival to unit and care assumed.

## 2019-08-18 NOTE — PROGRESS NOTES
Problem: Falls - Risk of  Goal: *Absence of Falls  Description  Document Hilda Chávez Fall Risk and appropriate interventions in the flowsheet.   Outcome: Progressing Towards Goal  Note:   Fall Risk Interventions:  Mobility Interventions: Assess mobility with egress test         Medication Interventions: Teach patient to arise slowly, Patient to call before getting OOB                   Problem: Patient Education: Go to Patient Education Activity  Goal: Patient/Family Education  Outcome: Progressing Towards Goal     Problem: Patient Education: Go to Patient Education Activity  Goal: Patient/Family Education  Outcome: Progressing Towards Goal     Problem: Unstable angina/NSTEMI: Day of Admission/Day 1  Goal: Off Pathway (Use only if patient is Off Pathway)  Outcome: Progressing Towards Goal  Goal: Activity/Safety  Outcome: Progressing Towards Goal  Goal: Consults, if ordered  Outcome: Progressing Towards Goal  Goal: Diagnostic Test/Procedures  Outcome: Progressing Towards Goal  Goal: Nutrition/Diet  Outcome: Progressing Towards Goal  Goal: Discharge Planning  Outcome: Progressing Towards Goal  Goal: Medications  Outcome: Progressing Towards Goal  Goal: Respiratory  Outcome: Progressing Towards Goal  Goal: Treatments/Interventions/Procedures  Outcome: Progressing Towards Goal  Goal: Psychosocial  Outcome: Progressing Towards Goal  Goal: *Hemodynamically stable  Outcome: Progressing Towards Goal  Goal: *Optimal pain control at patient's stated goal  Outcome: Progressing Towards Goal  Goal: *Lungs clear or at baseline  Outcome: Progressing Towards Goal

## 2019-08-19 ENCOUNTER — HOME HEALTH ADMISSION (OUTPATIENT)
Dept: HOME HEALTH SERVICES | Facility: HOME HEALTH | Age: 59
End: 2019-08-19

## 2019-08-19 ENCOUNTER — APPOINTMENT (OUTPATIENT)
Dept: NON INVASIVE DIAGNOSTICS | Age: 59
DRG: 247 | End: 2019-08-19
Attending: INTERNAL MEDICINE
Payer: OTHER GOVERNMENT

## 2019-08-19 VITALS
RESPIRATION RATE: 16 BRPM | SYSTOLIC BLOOD PRESSURE: 116 MMHG | HEART RATE: 58 BPM | OXYGEN SATURATION: 98 % | DIASTOLIC BLOOD PRESSURE: 72 MMHG | BODY MASS INDEX: 34.56 KG/M2 | WEIGHT: 228 LBS | TEMPERATURE: 98 F | HEIGHT: 68 IN

## 2019-08-19 PROBLEM — I21.4 NSTEMI (NON-ST ELEVATED MYOCARDIAL INFARCTION) (HCC): Status: ACTIVE | Noted: 2019-08-19

## 2019-08-19 LAB
ANION GAP SERPL CALC-SCNC: 7 MMOL/L (ref 3–18)
BASOPHILS # BLD: 0 K/UL (ref 0–0.1)
BASOPHILS NFR BLD: 0 % (ref 0–2)
BUN SERPL-MCNC: 18 MG/DL (ref 7–18)
BUN/CREAT SERPL: 13 (ref 12–20)
CALCIUM SERPL-MCNC: 9 MG/DL (ref 8.5–10.1)
CHLORIDE SERPL-SCNC: 103 MMOL/L (ref 100–111)
CO2 SERPL-SCNC: 29 MMOL/L (ref 21–32)
CREAT SERPL-MCNC: 1.35 MG/DL (ref 0.6–1.3)
DIFFERENTIAL METHOD BLD: ABNORMAL
ECHO AO ASC DIAM: 3.28 CM
ECHO AO ROOT DIAM: 3.77 CM
ECHO AV AREA PEAK VELOCITY: 2.4 CM2
ECHO AV AREA VTI: 2.1 CM2
ECHO AV AREA/BSA PEAK VELOCITY: 1.1 CM2/M2
ECHO AV AREA/BSA VTI: 1 CM2/M2
ECHO AV MEAN GRADIENT: 3.2 MMHG
ECHO AV PEAK GRADIENT: 6.5 MMHG
ECHO AV PEAK VELOCITY: 127.53 CM/S
ECHO AV VTI: 26.71 CM
ECHO LA MAJOR AXIS: 4.14 CM
ECHO LA VOL 2C: 41.85 ML (ref 18–58)
ECHO LA VOL 4C: 43.47 ML (ref 18–58)
ECHO LA VOL BP: 47.12 ML (ref 18–58)
ECHO LA VOL/BSA BIPLANE: 21.81 ML/M2 (ref 16–28)
ECHO LA VOLUME INDEX A2C: 19.37 ML/M2 (ref 16–28)
ECHO LA VOLUME INDEX A4C: 20.12 ML/M2 (ref 16–28)
ECHO LV E' LATERAL VELOCITY: 12 CM/S
ECHO LV E' SEPTAL VELOCITY: 10 CM/S
ECHO LV EDV A2C: 55.3 ML
ECHO LV EDV A4C: 84 ML
ECHO LV EDV BP: 76.6 ML (ref 67–155)
ECHO LV EDV INDEX A4C: 38.9 ML/M2
ECHO LV EDV INDEX BP: 35.4 ML/M2
ECHO LV EDV NDEX A2C: 25.6 ML/M2
ECHO LV EJECTION FRACTION A2C: 60 %
ECHO LV EJECTION FRACTION A4C: 58 %
ECHO LV EJECTION FRACTION BIPLANE: 60.8 % (ref 55–100)
ECHO LV ESV A2C: 22.1 ML
ECHO LV ESV A4C: 35.6 ML
ECHO LV ESV BP: 30.1 ML (ref 22–58)
ECHO LV ESV INDEX A2C: 10.2 ML/M2
ECHO LV ESV INDEX A4C: 16.5 ML/M2
ECHO LV ESV INDEX BP: 13.9 ML/M2
ECHO LV INTERNAL DIMENSION DIASTOLIC: 4.26 CM (ref 4.2–5.9)
ECHO LV INTERNAL DIMENSION SYSTOLIC: 2.3 CM
ECHO LV IVSD: 1.27 CM (ref 0.6–1)
ECHO LV MASS 2D: 205.8 G (ref 88–224)
ECHO LV MASS INDEX 2D: 95.2 G/M2 (ref 49–115)
ECHO LV POSTERIOR WALL DIASTOLIC: 1.07 CM (ref 0.6–1)
ECHO LVOT DIAM: 1.94 CM
ECHO LVOT PEAK GRADIENT: 4.2 MMHG
ECHO LVOT PEAK VELOCITY: 102.22 CM/S
ECHO LVOT VTI: 19.17 CM
ECHO MV A VELOCITY: 50.21 CM/S
ECHO MV AREA PHT: 3.2 CM2
ECHO MV E DECELERATION TIME (DT): 235.5 MS
ECHO MV E VELOCITY: 68.33 CM/S
ECHO MV E/A RATIO: 1.36
ECHO MV E/E' LATERAL: 5.69
ECHO MV E/E' RATIO (AVERAGED): 6.26
ECHO MV E/E' SEPTAL: 6.83
ECHO MV PRESSURE HALF TIME (PHT): 68.3 MS
ECHO MV REGURGITANT PEAK GRADIENT: 43.8 MMHG
ECHO MV REGURGITANT PEAK VELOCITY: 330.79 CM/S
ECHO RA AREA 4C: 9.57 CM2
ECHO RV INTERNAL DIMENSION: 3.67 CM
ECHO TRICUSPID ANNULAR PEAK SYSTOLIC VELOCITY: 1.8 CM/S
ECHO TV REGURGITANT MAX VELOCITY: 233.41 CM/S
ECHO TV REGURGITANT PEAK GRADIENT: 21.8 MMHG
EOSINOPHIL # BLD: 0.1 K/UL (ref 0–0.4)
EOSINOPHIL NFR BLD: 2 % (ref 0–5)
ERYTHROCYTE [DISTWIDTH] IN BLOOD BY AUTOMATED COUNT: 13.3 % (ref 11.6–14.5)
GLUCOSE BLD STRIP.AUTO-MCNC: 102 MG/DL (ref 70–110)
GLUCOSE BLD STRIP.AUTO-MCNC: 104 MG/DL (ref 70–110)
GLUCOSE SERPL-MCNC: 100 MG/DL (ref 74–99)
HCT VFR BLD AUTO: 39.9 % (ref 36–48)
HGB BLD-MCNC: 13.9 G/DL (ref 13–16)
LYMPHOCYTES # BLD: 1.9 K/UL (ref 0.9–3.6)
LYMPHOCYTES NFR BLD: 22 % (ref 21–52)
MAGNESIUM SERPL-MCNC: 2.2 MG/DL (ref 1.6–2.6)
MCH RBC QN AUTO: 32.6 PG (ref 24–34)
MCHC RBC AUTO-ENTMCNC: 34.8 G/DL (ref 31–37)
MCV RBC AUTO: 93.4 FL (ref 74–97)
MONOCYTES # BLD: 0.6 K/UL (ref 0.05–1.2)
MONOCYTES NFR BLD: 7 % (ref 3–10)
NEUTS SEG # BLD: 5.8 K/UL (ref 1.8–8)
NEUTS SEG NFR BLD: 69 % (ref 40–73)
PLATELET # BLD AUTO: 244 K/UL (ref 135–420)
PMV BLD AUTO: 8.8 FL (ref 9.2–11.8)
POTASSIUM SERPL-SCNC: 4 MMOL/L (ref 3.5–5.5)
RBC # BLD AUTO: 4.27 M/UL (ref 4.7–5.5)
SODIUM SERPL-SCNC: 139 MMOL/L (ref 136–145)
WBC # BLD AUTO: 8.5 K/UL (ref 4.6–13.2)

## 2019-08-19 PROCEDURE — 80048 BASIC METABOLIC PNL TOTAL CA: CPT

## 2019-08-19 PROCEDURE — 74011250636 HC RX REV CODE- 250/636: Performed by: INTERNAL MEDICINE

## 2019-08-19 PROCEDURE — 36415 COLL VENOUS BLD VENIPUNCTURE: CPT

## 2019-08-19 PROCEDURE — 85025 COMPLETE CBC W/AUTO DIFF WBC: CPT

## 2019-08-19 PROCEDURE — 74011250637 HC RX REV CODE- 250/637: Performed by: INTERNAL MEDICINE

## 2019-08-19 PROCEDURE — 83735 ASSAY OF MAGNESIUM: CPT

## 2019-08-19 PROCEDURE — 82962 GLUCOSE BLOOD TEST: CPT

## 2019-08-19 PROCEDURE — 93306 TTE W/DOPPLER COMPLETE: CPT

## 2019-08-19 RX ORDER — ASPIRIN 81 MG/1
81 TABLET ORAL DAILY
Qty: 30 TAB | Refills: 0 | Status: SHIPPED | OUTPATIENT
Start: 2019-08-20

## 2019-08-19 RX ORDER — ATORVASTATIN CALCIUM 40 MG/1
40 TABLET, FILM COATED ORAL
Qty: 30 TAB | Refills: 0 | Status: SHIPPED | OUTPATIENT
Start: 2019-08-19

## 2019-08-19 RX ORDER — METOPROLOL TARTRATE 25 MG/1
12.5 TABLET, FILM COATED ORAL EVERY 12 HOURS
Qty: 30 TAB | Refills: 0 | Status: SHIPPED | OUTPATIENT
Start: 2019-08-19

## 2019-08-19 RX ADMIN — ENOXAPARIN SODIUM 40 MG: 40 INJECTION SUBCUTANEOUS at 10:24

## 2019-08-19 RX ADMIN — METOPROLOL TARTRATE 12.5 MG: 25 TABLET ORAL at 08:22

## 2019-08-19 RX ADMIN — ASPIRIN 81 MG: 81 TABLET ORAL at 08:22

## 2019-08-19 RX ADMIN — TICAGRELOR 90 MG: 90 TABLET ORAL at 10:24

## 2019-08-19 NOTE — PROGRESS NOTES
Discharge instructions reviewed with the patient. Patient verbalized understanding and verified by teach back. All questions answered. IV discontinued, no redness, swelling or pain noted. Patient wife at bedside for transportation home. Patient armband removed and shredded.

## 2019-08-19 NOTE — PROGRESS NOTES
0700 Assumed patient care from [address], RN.    0800 Assessment complete. Patient is resting comfortably in bed. No SOB or chest pain. Puncture site is clean, dry, intact, and no swelling. Pulses are palpable in all extremities. 1200 Reassessment complete. No pain or SOB. Patient awaiting discharge. 1400 Patient discharged home.  Patient armband removed and shredded

## 2019-08-19 NOTE — PROGRESS NOTES
Patient: Wilner Calhoun         YOB: 1960        DOA: 8/16/2019  Discharge Date: 8/19/19     Time to return work :   On Aug 27,2019    Restriction:    No lifting over 20 LB till cardiology clearance       Sukhi Newton MD.

## 2019-08-19 NOTE — DISCHARGE SUMMARY
Discharge Summary    Patient: Darion Reyes MRN: [id number]  CSN: [id number]    YOB: 1960  Age: 64 y.o. Sex: male    DOA: 8/16/2019 LOS:  LOS: 3 days   Discharge Date: 8/19/2019     Primary Care Provider:  Marlen Dillon MD    Admission Diagnoses: Exertional chest pain [R07.9]    Discharge Diagnoses:    Hospital Problems  Never Reviewed          Codes Class Noted POA    NSTEM[facility] (non-ST elevated myocardial infarction) [facility] ICD-10-CM: I21.4  ICD-9-CM: 410.70  8/19/2019 Unknown        * (Principal) Exertional chest pain ICD-10-CM: R07.9  ICD-9-CM: 786.50  8/16/2019 Yes        PVC's (premature ventricular contractions) ICD-10-CM: I49.3  ICD-9-CM: 427.69  8/16/2019 Yes        TERRY on CPAP ICD-10-CM: G47.33, Z99.89  ICD-9-CM: 327.23, V46.8  8/16/2019 Unknown        Elevated serum creatinine ICD-10-CM: R79.89  ICD-9-CM: 790.99  8/16/2019 Unknown        Chest pain ICD-10-CM: R07.9  ICD-9-CM: 786.50  8/16/2019     Overview Signed 8/18/2019 10:05 AM by Andrea Prieto RN     Added automatically from request for surgery [id number]                   Discharge Condition: stable     Discharge Medications:     Discharge Medication List as of 8/19/2019  1:46 PM      START taking these medications    Details   aspirin delayed-release 81 mg tablet Take 1 Tab by mouth daily. , Normal, Disp-30 Tab, R-0      atorvastatin (LIPITOR) 40 mg tablet Take 1 Tab by mouth nightly., Normal, Disp-30 Tab, R-0      metoprolol tartrate (LOPRESSOR) 25 mg tablet Take 0.5 Tabs by mouth every twelve (12) hours. , Normal, Disp-30 Tab, R-0      ticagrelor (BRILINTA) 90 mg tablet Take 1 Tab by mouth every twelve (12) hours every twelve (12) hours. , Normal, Disp-60 Tab, R-0             Procedures : left heart cath stent to mLAD with MARCO    Consults: Cardiology      PHYSICAL EXAM   Visit Vitals  /72 (BP 1 Location: Left arm, BP Patient Position: At rest)   Pulse (!) 58   Temp 98 °F (36.7 °C)   Resp 16   Ht 5' 8\" (1.727 m)   Wt 103.4 kg (228 lb)   SpO2 98%   BMI 34.67 kg/m²     General: Awake, cooperative, no acute distress    HEENT: NC, Atraumatic. PERRLA, EOMI. Anicteric sclerae. Lungs:  CTA Bilaterally. No Wheezing/Rhonchi/Rales. Heart:  Regular  rhythm,  No murmur, No Rubs, No Gallops  Abdomen: Soft, Non distended, Non tender. +Bowel sounds,   Extremities: No c/c/e  Psych:   Not anxious or agitated. Neurologic:  No acute neurological deficits. Admission HPI :   Gertrude Aquino is a 64 y.o. male who hx of dale on cpap, migraine came to ER due to chest pain. Located rt up chest, sharp-dull pain , 5/10 now. Some times  Radiate to rt shoulder,  not associated with diaphoresis, palpitation/sob/dizziness, but associated with nausea. He had first episode several days ago, resolved per resting. He had dental appointment today, the similar chest pain came back. His wife drove him to ER. He has fhx of MI and he concerns about it. He is not a smoker, drinks alcohol occasionally. No recent travel. He was given nitro -his bp low. Cardiologist was called, heparin gtt started, aspirin was given. Trop x1 negative. cxr no acute issue, ekg no st seg change. Hospital Course :     Since he was admitted, acs was ruled in. Left heart cath was done per Dr. Carolee Campo , [address] [place] [address] with MARCO. brilinta was recommended with aspirin. bbb and lipitor. No chest pain after stent. We also continued cpap for dale. He remained hemodynamic stable and was cleared to be d.c per cardiologist   Discharge planning discussed with patient and family, agree with the plan and no questions and concerns at this point.        Activity: No lifting, Driving, or Strenuous exercise for 8 weeks    Diet: Cardiac Diet    Follow-up: PCp and dr. Johnson Valdovinos     Disposition: home      Minutes spent on discharge: 45 min       Labs: Results:       Chemistry Recent Labs     08/19/19  0439 08/18/19  0445 08/17/19  0116   * 106* 116*    140 141   K 4.0 4.1 4.4    104 104   CO2 29 28 30   BUN 18 19* 19*   CREA 1.35* 1.35* 1.38*   CA 9.0 8.3* 8.5   AGAP 7 8 7   BUCR 13 14 14      CBC w/Diff Recent Labs     08/19/19  0439 08/18/19  1220 08/18/19  0445 08/17/19  0116   WBC 8.5  --  6.5 7.7   RBC 4.27*  --  4.14* 3.92*   HGB 13.9 13.7 13.3 12.6*   HCT 39.9 38.8 38.8 36.3     --  253 224   GRANS 69  --  58 65   LYMPH 22  --  33 28   EOS 2  --  2 1      Cardiac Enzymes Recent Labs     08/17/19  0735 08/17/19  0116    91   CKND1 3.0 2.2      Coagulation Recent Labs     08/18/19  0445 08/17/19  0735   PTP 12.3  --    INR 0.9  --    APTT 47.2* 82.0*       Lipid Panel Lab Results   Component Value Date/Time    Cholesterol, total 234 (H) 08/16/2019 04:58 PM    HDL Cholesterol 29 (L) 08/16/2019 04:58 PM    LDL, calculated 125.8 (H) 08/16/2019 04:58 PM    VLDL, calculated 79.2 08/16/2019 04:58 PM    Triglyceride 396 (H) 08/16/2019 04:58 PM    CHOL/HDL Ratio 8.1 (H) 08/16/2019 04:58 PM      BNP No results for input(s): BNPP in the last 72 hours. Liver Enzymes No results for input(s): TP, ALB, TBIL, AP, SGOT, GPT in the last 72 hours. No lab exists for component: DBIL   Thyroid Studies Lab Results   Component Value Date/Time    TSH 1.52 08/16/2019 04:58 PM            Significant Diagnostic Studies: Xr Chest Port    Result Date: 8/16/2019  EXAM: XR CHEST PORT CLINICAL INDICATION/HISTORY: Chest pain -Additional: None COMPARISON: None TECHNIQUE: Frontal view of the chest  FINDINGS: HEART AND MEDIASTINUM: Normal cardiac size and mediastinal contours. LUNGS AND PLEURAL SPACES: Lungs are mildly underexpanded. No focal pneumonic consolidation, pneumothorax, or pleural effusion. BONY THORAX AND SOFT TISSUES: No acute osseous abnormality    IMPRESSION: Underexpanded lungs without superimposed acute radiographic cardiopulmonary abnormality.               [facility] [place] [facility]     CC: Fauzia Aguilar MD

## 2019-08-19 NOTE — PROGRESS NOTES
Problem: Falls - Risk of  Goal: *Absence of Falls  Description  Document Hortencia Girt Fall Risk and appropriate interventions in the flowsheet.   Outcome: Progressing Towards Goal  Note:   Fall Risk Interventions:  Mobility Interventions: Assess mobility with egress test         Medication Interventions: Teach patient to arise slowly, Patient to call before getting OOB                   Problem: Patient Education: Go to Patient Education Activity  Goal: Patient/Family Education  Outcome: Progressing Towards Goal     Problem: Unstable angina/NSTEMI: Day 2  Goal: Off Pathway (Use only if patient is Off Pathway)  Outcome: Progressing Towards Goal  Goal: Activity/Safety  Outcome: Progressing Towards Goal  Goal: Consults, if ordered  Outcome: Progressing Towards Goal  Goal: Diagnostic Test/Procedures  Outcome: Progressing Towards Goal  Goal: Nutrition/Diet  Outcome: Progressing Towards Goal  Goal: Discharge Planning  Outcome: Progressing Towards Goal  Goal: Medications  Outcome: Progressing Towards Goal  Goal: Respiratory  Outcome: Progressing Towards Goal  Goal: Treatments/Interventions/Procedures  Outcome: Progressing Towards Goal  Goal: Psychosocial  Outcome: Progressing Towards Goal  Goal: *Hemodynamically stable  Outcome: Progressing Towards Goal  Goal: *Optimal pain control at patient's stated goal  Outcome: Progressing Towards Goal  Goal: *Lungs clear or at baseline  Outcome: Progressing Towards Goal     Problem: Unstable Angina/NSTEMI: Discharge Outcomes  Goal: *Hemodynamically stable  Outcome: Progressing Towards Goal  Goal: *Stable cardiac rhythm  Outcome: Progressing Towards Goal  Goal: *Lungs clear or at baseline  Outcome: Progressing Towards Goal  Goal: *Optimal pain control at patient's stated goal  Outcome: Progressing Towards Goal  Goal: *Identifies cardiac risk factors  Outcome: Progressing Towards Goal  Goal: *Verbalizes home exercise program, activity guidelines, cardiac precautions  Outcome: Progressing Towards Goal  Goal: *Verbalizes understanding and describes prescribed diet  Outcome: Progressing Towards Goal  Goal: *Anxiety reduced or absent  Outcome: Progressing Towards Goal  Goal: *Understands and describes signs and symptoms to report to providers(Stroke Metric)  Outcome: Progressing Towards Goal  Goal: *Describes follow-up/return visits to physicians  Outcome: Progressing Towards Goal  Goal: *Describes available resources and support systems  Outcome: Progressing Towards Goal  Goal: *Influenza immunization  Outcome: Progressing Towards Goal  Goal: *Pneumococcal immunization  Outcome: Progressing Towards Goal  Goal: *Describes smoking cessation resources  Outcome: Progressing Towards Goal     Problem: Pain  Goal: *Control of Pain  Outcome: Progressing Towards Goal  Goal: *PALLIATIVE CARE:  Alleviation of Pain  Outcome: Progressing Towards Goal

## 2019-08-19 NOTE — DISCHARGE INSTRUCTIONS

## 2019-08-19 NOTE — PROGRESS NOTES
Transition of Care (MATHIEU) Plan:  Chart reviewed, attended rounding with MD. Pt for discharge today, is agreeable to [facility]; referral placed with CMS. Pt will need cardiology follow up, CMS to assist with scheduling. No other needs, pt planning return to work once released by MD to do so. MATHIEU Transportation:       How is patient being transported at discharge? By wife     When? Is transport scheduled? Follow-up appointment and transportation:     PCP? See AVS     Specialist?     Time/Date? Who is transporting to the follow-up appointment? Is transport for follow up appointment scheduled? Communication plan (with patient/family): Who is being called? Patient or Next of Kin? Responsible party? Patient      What number(s) is to be used? What service provider is calling for [facility]? [id number] for [facility]      When are they calling? 24-48 hrs after discharge    Readmission Risk? (Green/Low; Yellow/Moderate; Red/High):  green      Care Management Interventions  PCP Verified by CM: Yes  Mode of Transport at Discharge:  Other (see comment)(wife)  Transition of Care Consult (CM Consult): Discharge Planning  Current Support Network: Lives with Spouse  Confirm Follow Up Transport: Self(or family)  Plan discussed with Pt/Family/Caregiver: Yes  Freedom of Choice Offered: Yes  Discharge Location  Discharge Placement: Home with home health([facility])

## 2019-08-19 NOTE — PROGRESS NOTES
Problem: Falls - Risk of  Goal: *Absence of Falls  Description  Document Margrett Bernheim Fall Risk and appropriate interventions in the flowsheet.   Outcome: Progressing Towards Goal  Note:   Fall Risk Interventions:  Mobility Interventions: Assess mobility with egress test         Medication Interventions: Teach patient to arise slowly, Patient to call before getting OOB                   Problem: Patient Education: Go to Patient Education Activity  Goal: Patient/Family Education  Outcome: Progressing Towards Goal     Problem: Patient Education: Go to Patient Education Activity  Goal: Patient/Family Education  Outcome: Progressing Towards Goal     Problem: Unstable angina/NSTEMI: Day of Admission/Day 1  Goal: Off Pathway (Use only if patient is Off Pathway)  Outcome: Progressing Towards Goal  Goal: Activity/Safety  Outcome: Progressing Towards Goal  Goal: Consults, if ordered  Outcome: Progressing Towards Goal  Goal: Diagnostic Test/Procedures  Outcome: Progressing Towards Goal  Goal: Nutrition/Diet  Outcome: Progressing Towards Goal  Goal: Discharge Planning  Outcome: Progressing Towards Goal  Goal: Medications  Outcome: Progressing Towards Goal  Goal: Respiratory  Outcome: Progressing Towards Goal  Goal: Treatments/Interventions/Procedures  Outcome: Progressing Towards Goal  Goal: Psychosocial  Outcome: Progressing Towards Goal  Goal: *Hemodynamically stable  Outcome: Progressing Towards Goal  Goal: *Optimal pain control at patient's stated goal  Outcome: Progressing Towards Goal  Goal: *Lungs clear or at baseline  Outcome: Progressing Towards Goal     Problem: Unstable angina/NSTEMI: Day 2  Goal: Off Pathway (Use only if patient is Off Pathway)  Outcome: Progressing Towards Goal  Goal: Activity/Safety  Outcome: Progressing Towards Goal  Goal: Consults, if ordered  Outcome: Progressing Towards Goal  Goal: Diagnostic Test/Procedures  Outcome: Progressing Towards Goal  Goal: Nutrition/Diet  Outcome: Progressing Towards Goal  Goal: Discharge Planning  Outcome: Progressing Towards Goal  Goal: Medications  Outcome: Progressing Towards Goal  Goal: Respiratory  Outcome: Progressing Towards Goal  Goal: Treatments/Interventions/Procedures  Outcome: Progressing Towards Goal  Goal: Psychosocial  Outcome: Progressing Towards Goal  Goal: *Hemodynamically stable  Outcome: Progressing Towards Goal  Goal: *Optimal pain control at patient's stated goal  Outcome: Progressing Towards Goal  Goal: *Lungs clear or at baseline  Outcome: Progressing Towards Goal     Problem: Unstable Angina/NSTEMI: Discharge Outcomes  Goal: *Hemodynamically stable  Outcome: Progressing Towards Goal  Goal: *Stable cardiac rhythm  Outcome: Progressing Towards Goal  Goal: *Lungs clear or at baseline  Outcome: Progressing Towards Goal  Goal: *Optimal pain control at patient's stated goal  Outcome: Progressing Towards Goal  Goal: *Identifies cardiac risk factors  Outcome: Progressing Towards Goal  Goal: *Verbalizes home exercise program, activity guidelines, cardiac precautions  Outcome: Progressing Towards Goal  Goal: *Verbalizes understanding and describes prescribed diet  Outcome: Progressing Towards Goal  Goal: *Verbalizes name, dosage, time, side effects, and number of days to continue medications  Outcome: Progressing Towards Goal  Goal: *Anxiety reduced or absent  Outcome: Progressing Towards Goal  Goal: *Understands and describes signs and symptoms to report to providers(Stroke Metric)  Outcome: Progressing Towards Goal  Goal: *Describes follow-up/return visits to physicians  Outcome: Progressing Towards Goal  Goal: *Describes available resources and support systems  Outcome: Progressing Towards Goal  Goal: *Influenza immunization  Outcome: Progressing Towards Goal  Goal: *Pneumococcal immunization  Outcome: Progressing Towards Goal  Goal: *Describes smoking cessation resources  Outcome: Progressing Towards Goal     Problem: Pain  Goal: *Control of Pain  Outcome: Progressing Towards Goal  Goal: *PALLIATIVE CARE:  Alleviation of Pain  Outcome: Progressing Towards Goal     Problem: Patient Education: Go to Patient Education Activity  Goal: Patient/Family Education  Outcome: Progressing Towards Goal

## 2019-08-19 NOTE — PROGRESS NOTES
1930: Assumed care of the patient from Willie Villa RN. Patient is resting comfortably in bed in no apparent distress. Puncture site is dry, clan and intact. Patient denies any number on the left upper extremity. 2200: Patient resting in bed on home CPAP.    0730: Shift was uneventful. Bedside and Verbal shift change report given to Dariela Lyman RN (oncoming nurse) by Tomás Michelle RN (offgoing nurse). Report included the following information SBAR, Kardex, Intake/Output, MAR, Accordion, Recent Results and Med Rec Status.

## 2019-08-20 ENCOUNTER — HOME CARE VISIT (OUTPATIENT)
Dept: HOME HEALTH SERVICES | Facility: HOME HEALTH | Age: 59
End: 2019-08-20

## 2019-08-20 ENCOUNTER — HOME CARE VISIT (OUTPATIENT)
Dept: SCHEDULING | Facility: HOME HEALTH | Age: 59
End: 2019-08-20

## 2019-08-20 PROCEDURE — G0299 HHS/HOSPICE OF RN EA 15 MIN: HCPCS

## 2019-08-20 NOTE — PROGRESS NOTES
Cardiology Progress Note        Patient: Darion Reyes        Sex: male          DOA: 8/16/2019  YOB: 1960      Age:  64 y.o.        LOS:  LOS: 3 days    Patient seen and examined, chart reviewed. Assessment/Plan     Patient Active Problem List   Diagnosis Code    Migraine     PVC's (premature ventricular contractions) I49.3    TERRY on CPAP G47.33, Z99.89    Elevated serum creatinine R79.89      NSTEMI s/p PCI of mLAD with MARCO  Family history of premature coronary artery disease    Echocardiogram revealed normal LVEF    Plan:    Continue aspirin, Brlinta, metoprolol and atorvastatin. Ok to discharge from CV stand point. Follow up in cardiology clinic after 2 weeks. Subjective:    cc:  Denies any chest pain      REVIEW OF SYSTEMS:     General: No fevers or chills. Cardiovascular: No chest pain,No palpitations, No orthopnea, No PND, No leg swelling, No claudication  Pulmonary: No dyspnea. Gastrointestinal: No nausea, vomiting, bleeding  Neurology: No Dizziness    Objective:      Visit Vitals  /72 (BP 1 Location: Left arm, BP Patient Position: At rest)   Pulse (!) 58   Temp 98 °F (36.7 °C)   Resp 16   Ht 5' 8\" (1.727 m)   Wt 103.4 kg (228 lb)   SpO2 98%   BMI 34.67 kg/m²     Body mass index is 34.67 kg/m². Physical Exam:  General Appearance: Comfortable, not using accessory muscles of respiration. HEENT: SCOTT. HEAD: Atraumatic  NECK: No JVD, no thyroidomeglay. CAROTIDS: No bruit  LUNGS: Clear bilaterally. HEART: S1+S2 audible, no murmur, no pericardial rub. ABD: Non-tender, BS Audible    EXT: No edema, and no cyanosis. Left wrist: no swelling, no hematoma, no ecchymosis, no tenderness, left radial pulse +, normal motor and sensory exam of left hand  VASCULAR EXAM: Pulses are intact. PSYCHIATRIC EXAM: Mood is appropriate. MUSCULOSKELETAL: Grossly no joint deformity.   NEUROLOGICAL: AAO times 3, No motor and sensory deficit    Medication:  No current facility-administered medications for this encounter. Current Outpatient Medications   Medication Sig    [START ON 8/20/2019] aspirin delayed-release 81 mg tablet Take 1 Tab by mouth daily.  atorvastatin (LIPITOR) 40 mg tablet Take 1 Tab by mouth nightly.  metoprolol tartrate (LOPRESSOR) 25 mg tablet Take 0.5 Tabs by mouth every twelve (12) hours.  ticagrelor (BRILINTA) 90 mg tablet Take 1 Tab by mouth every twelve (12) hours every twelve (12) hours.                Lab/Data Reviewed:       Recent Labs     08/19/19  0439 08/18/19  1220 08/18/19  0445 08/17/19  0116   WBC 8.5  --  6.5 7.7   HGB 13.9 13.7 13.3 12.6*   HCT 39.9 38.8 38.8 36.3     --  253 224     Recent Labs     08/19/19  0439 08/18/19  0445 08/17/19  0116    140 141   K 4.0 4.1 4.4    104 104   CO2 29 28 30   * 106* 116*   BUN 18 19* 19*   CREA 1.35* 1.35* 1.38*   CA 9.0 8.3* 8.5       Signed By: Vipul Nielsen MD     August 19, 2019

## 2019-09-02 ENCOUNTER — HOSPITAL ENCOUNTER (EMERGENCY)
Age: 59
Discharge: HOME | End: 2019-09-02
Attending: EMERGENCY MEDICINE
Payer: OTHER GOVERNMENT

## 2019-09-02 ENCOUNTER — APPOINTMENT (OUTPATIENT)
Dept: GENERAL RADIOLOGY | Age: 59
End: 2019-09-02
Attending: EMERGENCY MEDICINE
Payer: OTHER GOVERNMENT

## 2019-09-02 VITALS
BODY MASS INDEX: 32.99 KG/M2 | HEART RATE: 60 BPM | OXYGEN SATURATION: 96 % | RESPIRATION RATE: 16 BRPM | DIASTOLIC BLOOD PRESSURE: 58 MMHG | WEIGHT: 217 LBS | SYSTOLIC BLOOD PRESSURE: 104 MMHG | TEMPERATURE: 98.5 F

## 2019-09-02 DIAGNOSIS — R07.9 CHEST PAIN, UNSPECIFIED TYPE: Primary | ICD-10-CM

## 2019-09-02 LAB
ALBUMIN SERPL-MCNC: 4.4 G/DL (ref 3.4–5)
ALBUMIN/GLOB SERPL: 1.3 {RATIO} (ref 0.8–1.7)
ALP SERPL-CCNC: 80 U/L (ref 45–117)
ALT SERPL-CCNC: 39 U/L (ref 16–61)
ANION GAP SERPL CALC-SCNC: 5 MMOL/L (ref 3–18)
AST SERPL-CCNC: 20 U/L (ref 10–38)
BASOPHILS # BLD: 0 K/UL (ref 0–0.1)
BASOPHILS NFR BLD: 0 % (ref 0–2)
BILIRUB SERPL-MCNC: 0.5 MG/DL (ref 0.2–1)
BUN SERPL-MCNC: 21 MG/DL (ref 7–18)
BUN/CREAT SERPL: 16 (ref 12–20)
CALCIUM SERPL-MCNC: 9 MG/DL (ref 8.5–10.1)
CHLORIDE SERPL-SCNC: 105 MMOL/L (ref 100–111)
CK MB CFR SERPL CALC: 0.8 % (ref 0–4)
CK MB SERPL-MCNC: 1.1 NG/ML (ref 5–25)
CK SERPL-CCNC: 144 U/L (ref 39–308)
CO2 SERPL-SCNC: 27 MMOL/L (ref 21–32)
CREAT SERPL-MCNC: 1.28 MG/DL (ref 0.6–1.3)
DIFFERENTIAL METHOD BLD: ABNORMAL
EOSINOPHIL # BLD: 0.1 K/UL (ref 0–0.4)
EOSINOPHIL NFR BLD: 1 % (ref 0–5)
ERYTHROCYTE [DISTWIDTH] IN BLOOD BY AUTOMATED COUNT: 13.1 % (ref 11.6–14.5)
GLOBULIN SER CALC-MCNC: 3.3 G/DL (ref 2–4)
GLUCOSE SERPL-MCNC: 100 MG/DL (ref 74–99)
HCT VFR BLD AUTO: 42.5 % (ref 36–48)
HGB BLD-MCNC: 14.9 G/DL (ref 13–16)
LYMPHOCYTES # BLD: 1.1 K/UL (ref 0.9–3.6)
LYMPHOCYTES NFR BLD: 10 % (ref 21–52)
MCH RBC QN AUTO: 32.9 PG (ref 24–34)
MCHC RBC AUTO-ENTMCNC: 35.1 G/DL (ref 31–37)
MCV RBC AUTO: 93.8 FL (ref 74–97)
MONOCYTES # BLD: 0.4 K/UL (ref 0.05–1.2)
MONOCYTES NFR BLD: 4 % (ref 3–10)
NEUTS SEG # BLD: 9.2 K/UL (ref 1.8–8)
NEUTS SEG NFR BLD: 85 % (ref 40–73)
PLATELET # BLD AUTO: 299 K/UL (ref 135–420)
PMV BLD AUTO: 9.1 FL (ref 9.2–11.8)
POTASSIUM SERPL-SCNC: 4.3 MMOL/L (ref 3.5–5.5)
PROT SERPL-MCNC: 7.7 G/DL (ref 6.4–8.2)
RBC # BLD AUTO: 4.53 M/UL (ref 4.7–5.5)
SODIUM SERPL-SCNC: 137 MMOL/L (ref 136–145)
TROPONIN I SERPL-MCNC: <0.02 NG/ML (ref 0–0.04)
TROPONIN I SERPL-MCNC: <0.02 NG/ML (ref 0–0.04)
WBC # BLD AUTO: 10.8 K/UL (ref 4.6–13.2)

## 2019-09-02 PROCEDURE — 93005 ELECTROCARDIOGRAM TRACING: CPT

## 2019-09-02 PROCEDURE — 82550 ASSAY OF CK (CPK): CPT

## 2019-09-02 PROCEDURE — 80053 COMPREHEN METABOLIC PANEL: CPT

## 2019-09-02 PROCEDURE — 99285 EMERGENCY DEPT VISIT HI MDM: CPT

## 2019-09-02 PROCEDURE — 74011250637 HC RX REV CODE- 250/637: Performed by: PHYSICIAN ASSISTANT

## 2019-09-02 PROCEDURE — 85025 COMPLETE CBC W/AUTO DIFF WBC: CPT

## 2019-09-02 PROCEDURE — 71046 X-RAY EXAM CHEST 2 VIEWS: CPT

## 2019-09-02 RX ORDER — NITROGLYCERIN 0.4 MG/1
0.4 TABLET SUBLINGUAL
Status: DISCONTINUED | OUTPATIENT
Start: 2019-09-02 | End: 2019-09-02

## 2019-09-02 RX ORDER — ACETAMINOPHEN 325 MG/1
975 TABLET ORAL
Status: COMPLETED | OUTPATIENT
Start: 2019-09-02 | End: 2019-09-02

## 2019-09-02 RX ORDER — GUAIFENESIN 100 MG/5ML
162 LIQUID (ML) ORAL
Status: COMPLETED | OUTPATIENT
Start: 2019-09-02 | End: 2019-09-02

## 2019-09-02 RX ADMIN — ACETAMINOPHEN 975 MG: 325 TABLET ORAL at 17:23

## 2019-09-02 RX ADMIN — ASPIRIN 81 MG 162 MG: 81 TABLET ORAL at 17:22

## 2019-09-02 NOTE — ED NOTES
Assumed care of patient at this time. Patient is ambulatory to ED bed 1 with c/o mid chest tightness x 2 days. Patient had a heart attack and stent placement 2 weeks ago and is concerned. Patient is supposed to go back to work tomorrow and states it may be anxiety. Wife at bedside.

## 2019-09-02 NOTE — ED NOTES
Verbal report received from [facility], [address] [place] [address]. Assumed care of patient at this time.

## 2019-09-02 NOTE — LETTER
NOTIFICATION RETURN TO WORK / SCHOOL 
 
9/2/2019 8:54 PM 
 
Mr. Johann Redding [address] [place] [id number] To Whom It May Concern: 
 
Johann Gutierrez is currently under the care of [facility] EMERGENCY DEPT. He will return to work/school on: 9/5/2019 If there are questions or concerns please contact our office. Sincerely, SIENNA Galindo

## 2019-09-02 NOTE — ED PROVIDER NOTES
EMERGENCY DEPARTMENT HISTORY AND PHYSICAL EXAM    Date: 9/2/2019  Patient Name: Chirag Winter    History of Presenting Illness     Chief Complaint   Patient presents with    Chest Pain         History Provided By: Patient    5:09 PM  Chirag Winter is a 64 y.o. male with PMHX of CAD status post stent who presents to the emergency department C/O dull right upper chest tightness onset 5 PM yesterday, approximately 24 hours ago. Patient states he was lifting and moving a heavy generator around 3 PM yesterday. 2 hours later while seated he developed this right upper chest tightness described as dull pain. Symptoms have been constant since onset, max intensity 3-4/10 and currently 1.5/10. He states this does not feel like when he had a heart attack 2 weeks ago. He underwent PCI with stenting of the mid LAD on 8/16/2019 by Dr. Abel Shaver. Patient states he has been doing well, walking 3 miles per day without any issues and has been compliant with his medications. . Pt denies fever, shortness of breath, cough, leg swelling or pain, nausea, vomiting, and any other sxs or complaints. PCP: Cristina Munoz MD    Current Outpatient Medications   Medication Sig Dispense Refill    aspirin delayed-release 81 mg tablet Take 1 Tab by mouth daily. 30 Tab 0    atorvastatin (LIPITOR) 40 mg tablet Take 1 Tab by mouth nightly. 30 Tab 0    metoprolol tartrate (LOPRESSOR) 25 mg tablet Take 0.5 Tabs by mouth every twelve (12) hours. 30 Tab 0    ticagrelor (BRILINTA) 90 mg tablet Take 1 Tab by mouth every twelve (12) hours every twelve (12) hours. 61 Tab 0       Past History     Past Medical History:  Past Medical History:   Diagnosis Date    Sleep apnea        Past Surgical History:  Past Surgical History:   Procedure Laterality Date    HX APPENDECTOMY         Family History:  History reviewed. No pertinent family history.     Social History:  Social History     Tobacco Use    Smoking status: Never Smoker    Smokeless tobacco: Never Used   Substance Use Topics    Alcohol use: Yes    Drug use: Not on file       Allergies:  No Known Allergies      Review of Systems   Review of Systems   Constitutional: Negative for fever. Respiratory: Positive for chest tightness. Negative for cough and shortness of breath. Cardiovascular: Negative for palpitations and leg swelling. Gastrointestinal: Negative for abdominal pain, nausea and vomiting. All other systems reviewed and are negative. Physical Exam     Vitals:    09/02/19 1610 09/02/19 1730 09/02/19 1800 09/02/19 1830   BP:  110/68 101/62 104/58   Pulse:  65 61 60   Resp:  18 18 16   Temp:       SpO2: 98% 97% 98% 96%   Weight:         Physical Exam    Vital signs and nursing notes reviewed. CONSTITUTIONAL: Alert. Well-appearing; well-nourished; in no apparent distress. HEAD: Normocephalic; atraumatic. EYES: PERRL; Conjunctiva clear. ENT: Moist mucus membranes. NECK: Supple; FROM without difficulty, non-tender; no cervical lymphadenopathy. CV: Normal S1, S2; no murmurs, rubs, or gallops. No chest wall tenderness. RESPIRATORY: Normal chest excursion with respiration; breath sounds clear and equal bilaterally; no wheezes, rhonchi, or rales. GI: Normal bowel sounds; non-distended; non-tender; no guarding or rigidity; no palpable organomegaly. No CVA tenderness. EXT: Normal ROM in all four extremities; non-tender to palpation. No edema or calf tenderness  SKIN: Normal for age and race; warm; dry; good turgor; no apparent lesions or exudate. NEURO: A & O x3. PSYCH:  Mood and affect appropriate.          Diagnostic Study Results     Labs -     Recent Results (from the past 12 hour(s))   EKG, 12 LEAD, INITIAL    Collection Time: 09/02/19  3:11 PM   Result Value Ref Range    Ventricular Rate 61 BPM    Atrial Rate 61 BPM    P-R Interval 144 ms    QRS Duration 84 ms    Q-T Interval 408 ms    QTC Calculation (Bezet) 410 ms    Calculated P Axis 31 degrees    Calculated R Axis -24 degrees    Calculated T Axis 7 degrees    Diagnosis       Normal sinus rhythm  Minimal voltage criteria for LVH, may be normal variant  Borderline ECG  When compared with ECG of 16-AUG-2019 18:03,  No significant change was found     CBC WITH AUTOMATED DIFF    Collection Time: 09/02/19  4:10 PM   Result Value Ref Range    WBC 10.8 4.6 - 13.2 K/uL    RBC 4.53 (L) 4.70 - 5.50 M/uL    HGB 14.9 13.0 - 16.0 g/dL    HCT 42.5 36.0 - 48.0 %    MCV 93.8 74.0 - 97.0 FL    MCH 32.9 24.0 - 34.0 PG    MCHC 35.1 31.0 - 37.0 g/dL    RDW 13.1 11.6 - 14.5 %    PLATELET 661 954 - 997 K/uL    MPV 9.1 (L) 9.2 - 11.8 FL    NEUTROPHILS 85 (H) 40 - 73 %    LYMPHOCYTES 10 (L) 21 - 52 %    MONOCYTES 4 3 - 10 %    EOSINOPHILS 1 0 - 5 %    BASOPHILS 0 0 - 2 %    ABS. NEUTROPHILS 9.2 (H) 1.8 - 8.0 K/UL    ABS. LYMPHOCYTES 1.1 0.9 - 3.6 K/UL    ABS. MONOCYTES 0.4 0.05 - 1.2 K/UL    ABS. EOSINOPHILS 0.1 0.0 - 0.4 K/UL    ABS. BASOPHILS 0.0 0.0 - 0.1 K/UL    DF AUTOMATED     METABOLIC PANEL, COMPREHENSIVE    Collection Time: 09/02/19  4:10 PM   Result Value Ref Range    Sodium 137 136 - 145 mmol/L    Potassium 4.3 3.5 - 5.5 mmol/L    Chloride 105 100 - 111 mmol/L    CO2 27 21 - 32 mmol/L    Anion gap 5 3.0 - 18 mmol/L    Glucose 100 (H) 74 - 99 mg/dL    BUN 21 (H) 7.0 - 18 MG/DL    Creatinine 1.28 0.6 - 1.3 MG/DL    BUN/Creatinine ratio 16 12 - 20      GFR est AA >60 >60 ml/min/1.73m2    GFR est non-AA 58 (L) >60 ml/min/1.73m2    Calcium 9.0 8.5 - 10.1 MG/DL    Bilirubin, total 0.5 0.2 - 1.0 MG/DL    ALT (SGPT) 39 16 - 61 U/L    AST (SGOT) 20 10 - 38 U/L    Alk.  phosphatase 80 45 - 117 U/L    Protein, total 7.7 6.4 - 8.2 g/dL    Albumin 4.4 3.4 - 5.0 g/dL    Globulin 3.3 2.0 - 4.0 g/dL    A-G Ratio 1.3 0.8 - 1.7     CARDIAC PANEL,(CK, CKMB & TROPONIN)    Collection Time: 09/02/19  4:10 PM   Result Value Ref Range     39 - 308 U/L    CK - MB 1.1 <3.6 ng/ml    CK-MB Index 0.8 0.0 - 4.0 %    Troponin-I, QT <0.02 0.0 - 0.045 NG/ML   TROPONIN I    Collection Time: 09/02/19  7:15 PM   Result Value Ref Range    Troponin-I, QT <0.02 0.0 - 0.045 NG/ML   EKG, 12 LEAD, SUBSEQUENT    Collection Time: 09/02/19  7:15 PM   Result Value Ref Range    Ventricular Rate 63 BPM    Atrial Rate 63 BPM    P-R Interval 170 ms    QRS Duration 88 ms    Q-T Interval 412 ms    QTC Calculation (Bezet) 421 ms    Calculated P Axis 25 degrees    Calculated R Axis -23 degrees    Calculated T Axis 1 degrees    Diagnosis       Normal sinus rhythm  Minimal voltage criteria for LVH, may be normal variant  Possible Anterior infarct , age undetermined  Abnormal ECG  When compared with ECG of 02-SEP-2019 15:11,  No significant change was found         Radiologic Studies -   XR CHEST PA LAT   Final Result   IMPRESSION:      No active cardiopulmonary disease. CT Results  (Last 48 hours)    None        CXR Results  (Last 48 hours)               09/02/19 1530  XR CHEST PA LAT Final result    Impression:  IMPRESSION:       No active cardiopulmonary disease. Narrative:  EXAM: CHEST RADIOGRAPHS       CLINICAL INDICATION/HISTORY: chest pain     > Additional: None       COMPARISON: 8/16/2019. TECHNIQUE: PA and lateral views of the chest       _______________       FINDINGS:       HEART AND MEDIASTINUM: No appreciable cardiomegaly. Remaining mediastinal   contours within normal limits. LUNGS AND PLEURAL SPACES: Shallow inspiration. No consolidation, mass or   effusion. BONY THORAX AND SOFT TISSUES: Multilevel spinal degenerative changes. _______________                 Medications given in the ED-  Medications   acetaminophen (TYLENOL) tablet 975 mg (975 mg Oral Given 9/2/19 1723)   aspirin chewable tablet 162 mg (162 mg Oral Given 9/2/19 1722)         Medical Decision Making   I am the first provider for this patient.     I reviewed the vital signs, available nursing notes, past medical history, past surgical history, family history and social history. Vital Signs-Reviewed the patient's vital signs. Pulse Oximetry Analysis - 98% on RA       EKG interpretation: (Preliminary)  Normal sinus rhythm, rate 86, no STEMI or acute changes. Records Reviewed: Nursing Notes      Procedures:  Procedures    ED Course:  5:09 PM   Initial assessment performed. The patients presenting problems have been discussed, and they are in agreement with the care plan formulated and outlined with them. I have encouraged them to ask questions as they arise throughout their visit. 5:30 PM consult note  Case discussed with cardiologist Dr. Luke Galarza who reviewed EKG, labs and recent catheterization report. He recommends second set of cardiac enzymes approximally 3 hours from initial trop and treat patient's pain. patient declined nitro as it dropped his blood pressure last time. Will give Tylenol with plan to repeat EKG and troponin. 6:15 PM progress note  Patient sleeping, easily aroused states he is feeling much better denies any pain at this time. Updated on plan to repeat EKG and cardiac panel around 7 PM.    8:30 PM consult note  Patient remains pain-free, resting comfortably. Repeat EKG unchanged and second troponin negative. Case discussed with cardiologist Dr. Luke Galarza who agrees with discharge home at this time and outpatient follow-up in the clinic. Diagnosis and Disposition       DISCHARGE NOTE:    Nahun Crawley  results have been reviewed with him. He has been counseled regarding his diagnosis, treatment, and plan. He verbally conveys understanding and agreement of the signs, symptoms, diagnosis, treatment and prognosis and additionally agrees to follow up as discussed. He also agrees with the care-plan and conveys that all of his questions have been answered.   I have also provided discharge instructions for him that include: educational information regarding their diagnosis and treatment, and list of reasons why they would want to return to the ED prior to their follow-up appointment, should his condition change. He has been provided with education for proper emergency department utilization. CLINICAL IMPRESSION:    1. Chest pain, unspecified type        PLAN:  1. D/C Home  2. Current Discharge Medication List        3. Follow-up Information     Follow up With Specialties Details Why Contact Info    Alicia Brush MD Cardiology Schedule an appointment as soon as possible for a visit  [address] [place], [place] [address] [facility] EMERGENCY DEPT Emergency Medicine  As needed, If symptoms worsen [address] [facility] [address]  [phone]        _______________________________      Please note that this dictation was completed with [employer], the computer voice recognition software. Quite often unanticipated grammatical, syntax, homophones, and other interpretive errors are inadvertently transcribed by the computer software. Please disregard these errors. Please excuse any errors that have escaped final proofreading.

## 2019-09-02 NOTE — ED NOTES
Updated patient and his wife on status. Advised we would do repeat EKG and labs at 1900. Patient verbalized understanding.

## 2019-09-03 LAB
ATRIAL RATE: 61 BPM
ATRIAL RATE: 63 BPM
CALCULATED P AXIS, ECG09: 25 DEGREES
CALCULATED P AXIS, ECG09: 31 DEGREES
CALCULATED R AXIS, ECG10: -23 DEGREES
CALCULATED R AXIS, ECG10: -24 DEGREES
CALCULATED T AXIS, ECG11: 1 DEGREES
CALCULATED T AXIS, ECG11: 7 DEGREES
DIAGNOSIS, 93000: NORMAL
DIAGNOSIS, 93000: NORMAL
P-R INTERVAL, ECG05: 144 MS
P-R INTERVAL, ECG05: 170 MS
Q-T INTERVAL, ECG07: 408 MS
Q-T INTERVAL, ECG07: 412 MS
QRS DURATION, ECG06: 84 MS
QRS DURATION, ECG06: 88 MS
QTC CALCULATION (BEZET), ECG08: 410 MS
QTC CALCULATION (BEZET), ECG08: 421 MS
VENTRICULAR RATE, ECG03: 61 BPM
VENTRICULAR RATE, ECG03: 63 BPM

## 2019-09-03 NOTE — DISCHARGE INSTRUCTIONS

## 2023-03-22 NOTE — PROGRESS NOTES
Patient resting well on home cpap. Problem: Prexisting or High Potential for Compromised Skin Integrity  Goal: Skin integrity is maintained or improved  Description: INTERVENTIONS:  - Identify patients at risk for skin breakdown  - Assess and monitor skin integrity  - Assess and monitor nutrition and hydration status  - Monitor labs   - Assess for incontinence   - Turn and reposition patient  - Assist with mobility/ambulation  - Relieve pressure over bony prominences  - Avoid friction and shearing  - Provide appropriate hygiene as needed including keeping skin clean and dry  - Evaluate need for skin moisturizer/barrier cream  - Collaborate with interdisciplinary team   - Patient/family teaching  - Consider wound care consult   Outcome: Progressing     Problem: MOBILITY - ADULT  Goal: Maintain or return to baseline ADL function  Description: INTERVENTIONS:  -  Assess patient's ability to carry out ADLs; assess patient's baseline for ADL function and identify physical deficits which impact ability to perform ADLs (bathing, care of mouth/teeth, toileting, grooming, dressing, etc )  - Assess/evaluate cause of self-care deficits   - Assess range of motion  - Assess patient's mobility; develop plan if impaired  - Assess patient's need for assistive devices and provide as appropriate  - Encourage maximum independence but intervene and supervise when necessary  - Involve family in performance of ADLs  - Assess for home care needs following discharge   - Consider OT consult to assist with ADL evaluation and planning for discharge  - Provide patient education as appropriate  Outcome: Progressing  Goal: Maintains/Returns to pre admission functional level  Description: INTERVENTIONS:  - Perform BMAT or MOVE assessment daily    - Set and communicate daily mobility goal to care team and patient/family/caregiver     - Collaborate with rehabilitation services on mobility goals if consulted  - Out of bed for toileting  - Record patient progress and toleration of activity level   Outcome: Progressing     Problem: PAIN - ADULT  Goal: Verbalizes/displays adequate comfort level or baseline comfort level  Description: Interventions:  - Encourage patient to monitor pain and request assistance  - Assess pain using appropriate pain scale  - Administer analgesics based on type and severity of pain and evaluate response  - Implement non-pharmacological measures as appropriate and evaluate response  - Consider cultural and social influences on pain and pain management  - Notify physician/advanced practitioner if interventions unsuccessful or patient reports new pain  Outcome: Progressing     Problem: INFECTION - ADULT  Goal: Absence or prevention of progression during hospitalization  Description: INTERVENTIONS:  - Assess and monitor for signs and symptoms of infection  - Monitor lab/diagnostic results  - Monitor all insertion sites, i e  indwelling lines, tubes, and drains  - Monitor endotracheal if appropriate and nasal secretions for changes in amount and color  - [place] appropriate cooling/warming therapies per order  - Administer medications as ordered  - Instruct and encourage patient and family to use good hand hygiene technique  - Identify and instruct in appropriate isolation precautions for identified infection/condition  Outcome: Progressing  Goal: Absence of fever/infection during neutropenic period  Description: INTERVENTIONS:  - Monitor WBC    Outcome: Progressing     Problem: SAFETY ADULT  Goal: Patient will remain free of falls  Description: INTERVENTIONS:  - Educate patient/family on patient safety including physical limitations  - Instruct patient to call for assistance with activity   - Consult OT/PT to assist with strengthening/mobility   - Keep Call bell within reach  - Keep bed low and locked with side rails adjusted as appropriate  - Keep care items and personal belongings within reach  - Initiate and maintain comfort rounds  - Make Fall Risk Sign visible to staff  - Apply yellow socks and bracelet for high fall risk patients  - Consider moving patient to room near nurses station  Outcome: Progressing  Goal: Maintain or return to baseline ADL function  Description: INTERVENTIONS:  -  Assess patient's ability to carry out ADLs; assess patient's baseline for ADL function and identify physical deficits which impact ability to perform ADLs (bathing, care of mouth/teeth, toileting, grooming, dressing, etc )  - Assess/evaluate cause of self-care deficits   - Assess range of motion  - Assess patient's mobility; develop plan if impaired  - Assess patient's need for assistive devices and provide as appropriate  - Encourage maximum independence but intervene and supervise when necessary  - Involve family in performance of ADLs  - Assess for home care needs following discharge   - Consider OT consult to assist with ADL evaluation and planning for discharge  - Provide patient education as appropriate  Outcome: Progressing  Goal: Maintains/Returns to pre admission functional level  Description: INTERVENTIONS:  - Perform BMAT or MOVE assessment daily    - Set and communicate daily mobility goal to care team and patient/family/caregiver     - Collaborate with rehabilitation services on mobility goals if consulted  - Out of bed for toileting  - Record patient progress and toleration of activity level   Outcome: Progressing     Problem: DISCHARGE PLANNING  Goal: Discharge to home or other facility with appropriate resources  Description: INTERVENTIONS:  - Identify barriers to discharge w/patient and caregiver  - Arrange for needed discharge resources and transportation as appropriate  - Identify discharge learning needs (meds, wound care, etc )  - Arrange for interpretive services to assist at discharge as needed  - Refer to Case Management Department for coordinating discharge planning if the patient needs post-hospital services based on physician/advanced practitioner order or complex needs related to functional status, cognitive ability, or social support system  Outcome: Progressing     Problem: Nutrition/Hydration-ADULT  Goal: Nutrient/Hydration intake appropriate for improving, restoring or maintaining nutritional needs  Description: Monitor and assess patient's nutrition/hydration status for malnutrition  Collaborate with interdisciplinary team and initiate plan and interventions as ordered  Monitor patient's weight and dietary intake as ordered or per policy  Utilize nutrition screening tool and intervene as necessary  Determine patient's food preferences and provide high-protein, high-caloric foods as appropriate       INTERVENTIONS:  - Monitor oral intake, urinary output, labs, and treatment plans  - Assess nutrition and hydration status and recommend course of action  - Evaluate amount of meals eaten  - Assist patient with eating if necessary   - Allow adequate time for meals  - Recommend/ encourage appropriate diets, oral nutritional supplements, and vitamin/mineral supplements  - Order, calculate, and assess calorie counts as needed  - Recommend, monitor, and adjust tube feedings and TPN/PPN based on assessed needs  - Assess need for intravenous fluids  - Provide specific nutrition/hydration education as appropriate  - Include patient/family/caregiver in decisions related to nutrition  Outcome: Progressing

## 2024-06-19 NOTE — Clinical Note
History and physical documented and up to date, allergies reviewed, lab results reviewed, pre-procedure education provided, patient verbalized understanding of procedure, procedural consent signed and patient is NPO. See other encounter 6/18/24

## (undated) DEVICE — TUBING PRSS MON L24IN PVC RIG NONEXPANDING M TO FEM CONN

## (undated) DEVICE — PRESSURE MONITORING SET: Brand: TRUWAVE

## (undated) DEVICE — PROCEDURE KIT FLUID MGMT 10 FR CUST MAINFOLD

## (undated) DEVICE — DRAPE,ANGIO,BRACH,STERILE,38X44: Brand: MEDLINE

## (undated) DEVICE — RUNTHROUGH NS EXTRA FLOPPY PTCA GUIDEWIRE: Brand: RUNTHROUGH

## (undated) DEVICE — CATH GUID COR EB30 6FR 100CM -- LAUNCHER

## (undated) DEVICE — COPILOT KIT INCLUDES BLEEDBACK CONTROL VALVE 20/30 INDEFLATOR INFLATION DEVICE 30 ATM 20 CC / GUIDE WIRE INTRODUCER / TORQUE DEVICE: Brand: INDEFLATOR

## (undated) DEVICE — STOPCOCK TRNSDUC 500PSI 3 W ROT M LUER LT BLU OFF HNDL R

## (undated) DEVICE — ANGIOGRAPHIC CATHETER: Brand: EXPO™

## (undated) DEVICE — GUIDEWIRE VASC L260CM DIA0.035IN RAD 3MM J TIP L7CM PTFE

## (undated) DEVICE — TREK CORONARY DILATATION CATHETER 2.50 MM X 15 MM / RAPID-EXCHANGE: Brand: TREK

## (undated) DEVICE — HEARTSTART ADULT RADIOTRANSPARENT PADS: Brand: HEARTSTART PADS

## (undated) DEVICE — SHIELD RAD 14X16 IN W/ SCOOP ABSORBER

## (undated) DEVICE — PACK PROCEDURE SURG CATH CUST

## (undated) DEVICE — SENSOR PLSE OXMTR AD CBL L36IN ADH FRM FIT SPO2 DISP

## (undated) DEVICE — Device

## (undated) DEVICE — BAND RADIAL COMPR ARTERY 24CM -- REG BX/10

## (undated) DEVICE — GLIDESHEATH SLENDER STAINLESS STEEL KIT: Brand: GLIDESHEATH SLENDER

## (undated) DEVICE — NC TREK CORONARY DILATATION CATHETER 3.75 MM X 12 MM / RAPID-EXCHANGE: Brand: NC TREK